# Patient Record
Sex: FEMALE | Race: BLACK OR AFRICAN AMERICAN | Employment: FULL TIME | URBAN - METROPOLITAN AREA
[De-identification: names, ages, dates, MRNs, and addresses within clinical notes are randomized per-mention and may not be internally consistent; named-entity substitution may affect disease eponyms.]

---

## 2017-12-11 ENCOUNTER — OP HISTORICAL/CONVERTED ENCOUNTER (OUTPATIENT)
Dept: OTHER | Age: 47
End: 2017-12-11

## 2018-02-07 ENCOUNTER — ED HISTORICAL/CONVERTED ENCOUNTER (OUTPATIENT)
Dept: OTHER | Age: 48
End: 2018-02-07

## 2019-01-31 ENCOUNTER — OP HISTORICAL/CONVERTED ENCOUNTER (OUTPATIENT)
Dept: OTHER | Age: 49
End: 2019-01-31

## 2019-02-08 LAB
CREATININE, EXTERNAL: 0.52
LDL-C, EXTERNAL: 136

## 2019-02-09 ENCOUNTER — OP HISTORICAL/CONVERTED ENCOUNTER (OUTPATIENT)
Dept: OTHER | Age: 49
End: 2019-02-09

## 2019-04-01 ENCOUNTER — OP HISTORICAL/CONVERTED ENCOUNTER (OUTPATIENT)
Dept: OTHER | Age: 49
End: 2019-04-01

## 2020-02-12 ENCOUNTER — OP HISTORICAL/CONVERTED ENCOUNTER (OUTPATIENT)
Dept: OTHER | Age: 50
End: 2020-02-12

## 2020-08-12 VITALS
TEMPERATURE: 98.9 F | RESPIRATION RATE: 18 BRPM | DIASTOLIC BLOOD PRESSURE: 85 MMHG | SYSTOLIC BLOOD PRESSURE: 131 MMHG | HEART RATE: 89 BPM | HEIGHT: 64 IN | WEIGHT: 237.6 LBS | BODY MASS INDEX: 40.56 KG/M2 | OXYGEN SATURATION: 98 %

## 2020-08-12 PROBLEM — M71.21 SYNOVIAL CYST OF RIGHT POPLITEAL SPACE: Status: ACTIVE | Noted: 2020-08-12

## 2020-08-12 PROBLEM — I10 ESSENTIAL HYPERTENSION: Status: ACTIVE | Noted: 2020-08-12

## 2020-08-12 PROBLEM — E66.01 MORBID OBESITY (HCC): Status: ACTIVE | Noted: 2020-08-12

## 2020-08-12 PROBLEM — M79.89 SWELLING OF LOWER LEG: Status: ACTIVE | Noted: 2020-08-12

## 2020-08-13 ENCOUNTER — OFFICE VISIT (OUTPATIENT)
Dept: INTERNAL MEDICINE CLINIC | Age: 50
End: 2020-08-13
Payer: COMMERCIAL

## 2020-08-13 VITALS
WEIGHT: 245.6 LBS | OXYGEN SATURATION: 97 % | HEART RATE: 97 BPM | DIASTOLIC BLOOD PRESSURE: 75 MMHG | HEIGHT: 62 IN | BODY MASS INDEX: 45.19 KG/M2 | TEMPERATURE: 99 F | SYSTOLIC BLOOD PRESSURE: 115 MMHG

## 2020-08-13 DIAGNOSIS — E66.01 MORBID OBESITY (HCC): Primary | ICD-10-CM

## 2020-08-13 DIAGNOSIS — I10 ESSENTIAL HYPERTENSION: ICD-10-CM

## 2020-08-13 PROCEDURE — 99213 OFFICE O/P EST LOW 20 MIN: CPT | Performed by: INTERNAL MEDICINE

## 2020-08-13 RX ORDER — NIFEDIPINE 60 MG/1
TABLET, EXTENDED RELEASE ORAL
COMMUNITY
Start: 2020-07-18 | End: 2020-12-21 | Stop reason: SDUPTHER

## 2020-08-13 RX ORDER — PHENTERMINE HYDROCHLORIDE 37.5 MG/1
37.5 TABLET ORAL
Qty: 30 TAB | Refills: 2 | Status: SHIPPED | OUTPATIENT
Start: 2020-08-13 | End: 2020-11-11

## 2020-08-13 RX ORDER — LORATADINE 10 MG/1
10 TABLET ORAL DAILY
COMMUNITY

## 2020-08-13 NOTE — PATIENT INSTRUCTIONS
Phentermine (By mouth) Phentermine (FEN-ter-meen) Helps you lose weight when used for a short time. Brand Name(s): Sandie Eng There may be other brand names for this medicine. When This Medicine Should Not Be Used: This medicine is not right for everyone. Do not use it if you had an allergic reaction to phentermine or similar medicines, or if you are pregnant. How to Use This Medicine:  
Dissolving Tablet, Capsule, Long Acting Capsule, Tablet, Dissolving Tablet · Your doctor will tell you how much medicine to use. Do not use more than directed. · This medicine is not for long-term use. · To avoid trouble sleeping, always take this medicine in the morning and never at bedtime or late in the evening. ¨ Take the capsule 2 hours after breakfast. 
¨ Take the extended-release capsule before breakfast. 
¨ Take the disintegrating tablet in the morning, with or without food. ¨ Take the phentermine tablet before breakfast or 1 to 2 hours after breakfast. 
¨ Take Lomaira tablet 30 minutes before meals. · Swallow the extended-release capsule whole. Do not crush, break, or chew it. · If you are using the disintegrating tablet, make sure your hands are dry before you handle the tablet. Place the tablet on your tongue. It should melt quickly. After the tablet has melted, swallow or take a drink of water. · Tablet: Swallow whole. Do not crush, break, or chew it. · Carefully follow your doctor's instructions about any special diet. · Missed dose: Take a dose as soon as you remember. If it is almost time for your next dose, wait until then and take a regular dose. Do not take extra medicine to make up for a missed dose. · Store the medicine in a closed container at room temperature, away from heat, moisture, and direct light. Drugs and Foods to Avoid: Ask your doctor or pharmacist before using any other medicine, including over-the-counter medicines, vitamins, and herbal products. · Do not use this medicine and an MAO inhibitor (MAOI) within 14 days of each other. · Some medicines can affect how phentermine works. Tell your doctor if you are using any of the following: ¨ Amphetamine medicine (including dextroamphetamine, methamphetamine) ¨ Diet pills ¨ Insulin or diabetes medicine ¨ Medicine to treat depression (including fluoxetine, fluvoxamine, paroxetine, sertraline) · Do not drink alcohol while you are using this medicine. Warnings While Using This Medicine: · It is not safe to take this medicine during pregnancy. It could harm an unborn baby. Tell your doctor right away if you become pregnant. · Tell your doctor if you are breastfeeding, or if you have kidney disease, diabetes, glaucoma, congestive heart failure, heart or blood vessel disease, high blood pressure, overactive thyroid, or a history of stroke or drug abuse. Tell your doctor if have allergies to aspirin or tartrazine. · This medicine may cause the following problems: 
¨ Primary pulmonary hypertension (a serious lung problem) ¨ Heart valve disease ¨ Changes in blood sugar levels · This medicine may make you dizzy or drowsy. Do not drive or do anything else that could be dangerous until you know how this medicine affects you. · This medicine can be habit-forming. Do not use more than your prescribed dose. Call your doctor if you think your medicine is not working. · Do not stop using this medicine suddenly. Your doctor will need to slowly decrease your dose before you stop it completely. · Your doctor will check your progress and the effects of this medicine at regular visits. Keep all appointments. · Keep all medicine out of the reach of children. Never share your medicine with anyone. Possible Side Effects While Using This Medicine:  
Call your doctor right away if you notice any of these side effects: · Allergic reaction: Itching or hives, swelling in your face or hands, swelling or tingling in your mouth or throat, chest tightness, trouble breathing · Chest pain, fainting, trouble breathing · Fast, slow, pounding, or uneven heartbeat · Seizures or tremors · Severe headache · Swelling of your feet or lower legs If you notice these less serious side effects, talk with your doctor: · Changes in sex drive · Dizziness, drowsiness, mild headache · Dry mouth or a bad taste in your mouth · Nausea, vomiting, diarrhea, constipation, stomach cramps · Restlessness or nervousness, trouble sleeping If you notice other side effects that you think are caused by this medicine, tell your doctor. Call your doctor for medical advice about side effects. You may report side effects to FDA at 1-165-FDA-9979 © 2017 [facility] [place] Information is for End User's use only and may not be sold, redistributed or otherwise used for commercial purposes. The above information is an  only. It is not intended as medical advice for individual conditions or treatments. Talk to your doctor, nurse or pharmacist before following any medical regimen to see if it is safe and effective for you.

## 2020-08-13 NOTE — PROGRESS NOTES
Verena Song is a 52 y.o. female and presents with Hypertension    Our last visit was virtual in May: \"essential hypertension  Not able to check it today, I encouraged her to get a bp cuff for self monitoring, revisited lifestyle modification. Renew NIFEdipine ER 60 mg tablet,extended release  insomnia  Discussed with her sleep hygiene measures. morbid obesity  Discussed importance of being constant with healthy habits. Goal to lose 15 lbs in the next 3 months, she has her diet at home, encouraged her to follow and encouraged her to chose healthy [place] carb snacks when doing grocery shopping, I gave her a list verbally\"    Today her blood pressure is at goal, well controlled, she has not lost any weight, the other hand she has gained 8 pounds since her last face-to-face visit in January. She says shee's snacking a lot in potato chips, ice cream, candy, she says because she's at home       Review of Systems  Review of Systems   Constitutional: Negative for chills, fatigue, fever and unexpected weight change. HENT: Negative for congestion, ear pain, sneezing and sore throat. Eyes: Negative for pain and discharge. Respiratory: Negative for cough, shortness of breath and wheezing. Cardiovascular: Negative for chest pain, palpitations and leg swelling. Gastrointestinal: Negative for abdominal pain, blood in stool, constipation and diarrhea. Endocrine: Negative for polydipsia and polyuria. Genitourinary: Negative for difficulty urinating, dysuria, frequency, hematuria and urgency. Musculoskeletal: Negative for arthralgias, back pain and joint swelling. Skin: Negative for rash. Allergic/Immunologic: Negative for environmental allergies and food allergies. Neurological: Negative for dizziness, speech difficulty, weakness, light-headedness, numbness and headaches. Hematological: Negative for adenopathy.    Psychiatric/Behavioral: Negative for behavioral problems (Depression), sleep disturbance and suicidal ideas. Past Medical History:   Diagnosis Date    Hypertension      Past Surgical History:   Procedure Laterality Date    HX HYSTERECTOMY       Social History     Socioeconomic History    Marital status: SINGLE     Spouse name: Not on file    Number of children: Not on file    Years of education: Not on file    Highest education level: Not on file   Tobacco Use    Smoking status: Never Smoker    Smokeless tobacco: Never Used   Substance and Sexual Activity    Alcohol use: Yes     Comment: occasional    Drug use: Never     Family History   Problem Relation Age of Onset    Hypertension Mother     Diabetes Father      Current Outpatient Medications   Medication Sig Dispense Refill    NIFEdipine ER (ADALAT CC) 60 mg ER tablet TAKE 1 TABLET BY MOUTH EVERY DAY      loratadine (Claritin) 10 mg tablet Take 10 mg by mouth daily.  phentermine (ADIPEX-P) 37.5 mg tablet Take 1 Tab by mouth every morning for 90 days. Max Daily Amount: 37.5 mg. Indications: weight loss management for an obese person 30 Tab 2     No Known Allergies    Objective:  Visit Vitals  /75 (BP 1 Location: Left arm, BP Patient Position: Sitting)   Pulse 97   Temp 99 °F (37.2 °C) (Oral)   Ht 5' 2\" (1.575 m)   Wt 245 lb 9.6 oz (111.4 kg)   SpO2 97% Comment: RA   BMI 44.92 kg/m²     Physical Exam:   Physical Exam  Constitutional:       General: She is not in acute distress. Appearance: Normal appearance. She is morbidly obese. HENT:      Head: Normocephalic and atraumatic. Mouth/Throat:      Mouth: Mucous membranes are moist.   Eyes:      Extraocular Movements: Extraocular movements intact. Conjunctiva/sclera: Conjunctivae normal.      Pupils: Pupils are equal, round, and reactive to light. Neck:      Musculoskeletal: Normal range of motion and neck supple. Cardiovascular:      Rate and Rhythm: Normal rate and regular rhythm. Pulses: Normal pulses. Heart sounds: Normal heart sounds. Pulmonary:      Effort: Pulmonary effort is normal.      Breath sounds: Normal breath sounds. Abdominal:      General: Abdomen is flat. Bowel sounds are normal. There is no distension. Palpations: Abdomen is soft. There is no mass. Tenderness: There is no abdominal tenderness. Musculoskeletal:         General: No swelling or deformity. Right lower leg: No edema. Left lower leg: No edema. Lymphadenopathy:      Cervical: No cervical adenopathy. Skin:     General: Skin is warm and dry. Capillary Refill: Capillary refill takes less than 2 seconds. Coloration: Skin is not jaundiced or pale. Findings: No erythema or rash. Neurological:      General: No focal deficit present. Mental Status: She is alert and oriented to person, place, and time. Psychiatric:         Mood and Affect: Mood normal.         Behavior: Behavior normal.         Thought Content: Thought content normal.         Judgment: Judgment normal.          Results for orders placed or performed in visit on 08/12/20   AMB EXT LDL-C   Result Value Ref Range    LDL-C, External 136    AMB EXT CREATININE   Result Value Ref Range    Creatinine, External 0.52        Assessment/Plan:      Pressures well controlled continue nifedipine 60 mg, she does not need refills today. Gaining weight, BMI 44, not following diet recommendations, she says she feels hungry all the time, now that her blood pressure is well controlled we can safely do a trial of phentermine for 3 months, I educated her about the purpose of this medication, potential side effects, goal to lose 5% of her weight within the next 3 months. ICD-10-CM ICD-9-CM    1. Morbid obesity (HCC)  E66.01 278.01 phentermine (ADIPEX-P) 37.5 mg tablet   2.  Essential hypertension  I10 401.9      Orders Placed This Encounter    NIFEdipine ER (ADALAT CC) 60 mg ER tablet     Sig: TAKE 1 TABLET BY MOUTH EVERY DAY    loratadine (Claritin) 10 mg tablet     Sig: Take 10 mg by mouth daily.  phentermine (ADIPEX-P) 37.5 mg tablet     Sig: Take 1 Tab by mouth every morning for 90 days. Max Daily Amount: 37.5 mg. Indications: weight loss management for an obese person     Dispense:  30 Tab     Refill:  2     lose weight, increase physical activity, follow low fat diet, call if any problems  Patient Instructions   Phentermine (By mouth)   Phentermine (FEN-ter-meen)  Helps you lose weight when used for a short time. Brand Name(s): Adipex-P, Lomaira   There may be other brand names for this medicine. When This Medicine Should Not Be Used: This medicine is not right for everyone. Do not use it if you had an allergic reaction to phentermine or similar medicines, or if you are pregnant. How to Use This Medicine:   Dissolving Tablet, Capsule, Long Acting Capsule, Tablet, Dissolving Tablet  · Your doctor will tell you how much medicine to use. Do not use more than directed. · This medicine is not for long-term use. · To avoid trouble sleeping, always take this medicine in the morning and never at bedtime or late in the evening. ¨ Take the capsule 2 hours after breakfast.  ¨ Take the extended-release capsule before breakfast.  ¨ Take the disintegrating tablet in the morning, with or without food. ¨ Take the phentermine tablet before breakfast or 1 to 2 hours after breakfast.  ¨ Take Lomaira tablet 30 minutes before meals. · Swallow the extended-release capsule whole. Do not crush, break, or chew it. · If you are using the disintegrating tablet, make sure your hands are dry before you handle the tablet. Place the tablet on your tongue. It should melt quickly. After the tablet has melted, swallow or take a drink of water. · Tablet: Swallow whole. Do not crush, break, or chew it. · Carefully follow your doctor's instructions about any special diet. · Missed dose: Take a dose as soon as you remember.  If it is almost time for your next dose, wait until then and take a regular dose. Do not take extra medicine to make up for a missed dose. · Store the medicine in a closed container at room temperature, away from heat, moisture, and direct light. Drugs and Foods to Avoid:   Ask your doctor or pharmacist before using any other medicine, including over-the-counter medicines, vitamins, and herbal products. · Do not use this medicine and an MAO inhibitor (MAOI) within 14 days of each other. · Some medicines can affect how phentermine works. Tell your doctor if you are using any of the following:  ¨ Amphetamine medicine (including dextroamphetamine, methamphetamine)  ¨ Diet pills  ¨ Insulin or diabetes medicine  ¨ Medicine to treat depression (including fluoxetine, fluvoxamine, paroxetine, sertraline)  · Do not drink alcohol while you are using this medicine. Warnings While Using This Medicine:   · It is not safe to take this medicine during pregnancy. It could harm an unborn baby. Tell your doctor right away if you become pregnant. · Tell your doctor if you are breastfeeding, or if you have kidney disease, diabetes, glaucoma, congestive heart failure, heart or blood vessel disease, high blood pressure, overactive thyroid, or a history of stroke or drug abuse. Tell your doctor if have allergies to aspirin or tartrazine. · This medicine may cause the following problems:  ¨ Primary pulmonary hypertension (a serious lung problem)  ¨ Heart valve disease  ¨ Changes in blood sugar levels  · This medicine may make you dizzy or drowsy. Do not drive or do anything else that could be dangerous until you know how this medicine affects you. · This medicine can be habit-forming. Do not use more than your prescribed dose. Call your doctor if you think your medicine is not working. · Do not stop using this medicine suddenly. Your doctor will need to slowly decrease your dose before you stop it completely. · Your doctor will check your progress and the effects of this medicine at regular visits.  Keep all appointments. · Keep all medicine out of the reach of children. Never share your medicine with anyone. Possible Side Effects While Using This Medicine:   Call your doctor right away if you notice any of these side effects:  · Allergic reaction: Itching or hives, swelling in your face or hands, swelling or tingling in your mouth or throat, chest tightness, trouble breathing  · Chest pain, fainting, trouble breathing  · Fast, slow, pounding, or uneven heartbeat  · Seizures or tremors  · Severe headache  · Swelling of your feet or lower legs  If you notice these less serious side effects, talk with your doctor:   · Changes in sex drive  · Dizziness, drowsiness, mild headache  · Dry mouth or a bad taste in your mouth  · Nausea, vomiting, diarrhea, constipation, stomach cramps  · Restlessness or nervousness, trouble sleeping  If you notice other side effects that you think are caused by this medicine, tell your doctor. Call your doctor for medical advice about side effects. You may report side effects to FDA at 1-820-JQZ-0805  © 2017 [facility] Information is for End User's use only and may not be sold, redistributed or otherwise used for commercial purposes. The above information is an  only. It is not intended as medical advice for individual conditions or treatments. Talk to your doctor, nurse or pharmacist before following any medical regimen to see if it is safe and effective for you. Follow-up and Dispositions    · Return in about 3 months (around 11/13/2020).

## 2020-12-21 ENCOUNTER — OFFICE VISIT (OUTPATIENT)
Dept: INTERNAL MEDICINE CLINIC | Age: 50
End: 2020-12-21
Payer: COMMERCIAL

## 2020-12-21 VITALS
OXYGEN SATURATION: 100 % | HEIGHT: 61 IN | TEMPERATURE: 97.8 F | WEIGHT: 230.2 LBS | DIASTOLIC BLOOD PRESSURE: 86 MMHG | BODY MASS INDEX: 43.46 KG/M2 | SYSTOLIC BLOOD PRESSURE: 136 MMHG | HEART RATE: 82 BPM

## 2020-12-21 DIAGNOSIS — I10 ESSENTIAL HYPERTENSION: Primary | ICD-10-CM

## 2020-12-21 DIAGNOSIS — Z12.11 COLON CANCER SCREENING: ICD-10-CM

## 2020-12-21 PROCEDURE — 99213 OFFICE O/P EST LOW 20 MIN: CPT | Performed by: INTERNAL MEDICINE

## 2020-12-21 RX ORDER — NIFEDIPINE 60 MG/1
60 TABLET, EXTENDED RELEASE ORAL DAILY
Qty: 90 TAB | Refills: 1 | Status: SHIPPED | OUTPATIENT
Start: 2020-12-21 | End: 2021-06-15 | Stop reason: SDUPTHER

## 2020-12-21 NOTE — PROGRESS NOTES
Mikie Thrasher is a 52 y.o. female and presents with Follow-up, Hypertension, Morbid Obesity, and Leg Swelling    Nisa Case is doing well, she still has episodes of ankle swelling, More swelinng towardsthe ennd of the day  No visit to the ER   Doesn't want flu shot  Pap Jan 2020 normal.         Review of Systems  Review of Systems   Constitutional: Negative for chills, fatigue, fever and unexpected weight change. HENT: Negative for congestion, ear pain, sneezing and sore throat. Eyes: Negative for pain and discharge. Respiratory: Negative for cough, shortness of breath and wheezing. Cardiovascular: Positive for leg swelling. Negative for chest pain and palpitations. Gastrointestinal: Negative for abdominal pain, blood in stool, constipation and diarrhea. Endocrine: Negative for polydipsia and polyuria. Genitourinary: Negative for difficulty urinating, dysuria, frequency, hematuria and urgency. Musculoskeletal: Negative for arthralgias, back pain and joint swelling. Skin: Negative for rash. Allergic/Immunologic: Negative for environmental allergies and food allergies. Neurological: Negative for dizziness, speech difficulty, weakness, light-headedness, numbness and headaches. Hematological: Negative for adenopathy. Psychiatric/Behavioral: Negative for behavioral problems (Depression), sleep disturbance and suicidal ideas.           Past Medical History:   Diagnosis Date    Hypertension      Past Surgical History:   Procedure Laterality Date    HX HYSTERECTOMY       Social History     Socioeconomic History    Marital status: SINGLE     Spouse name: Not on file    Number of children: Not on file    Years of education: Not on file    Highest education level: Not on file   Tobacco Use    Smoking status: Never Smoker    Smokeless tobacco: Never Used   Substance and Sexual Activity    Alcohol use: Yes     Frequency: 2-4 times a month     Drinks per session: 1 or 2     Binge frequency: Never     Comment: occasional    Drug use: Never     Family History   Problem Relation Age of Onset    Hypertension Mother     Diabetes Father      Current Outpatient Medications   Medication Sig Dispense Refill    NIFEdipine ER (ADALAT CC) 60 mg ER tablet Take 1 Tab by mouth daily for 180 days. 90 Tab 1    loratadine (Claritin) 10 mg tablet Take 10 mg by mouth daily. No Known Allergies    Objective:  Visit Vitals  /86 (BP 1 Location: Right arm, BP Patient Position: Sitting)   Pulse 82   Temp 97.8 °F (36.6 °C) (Oral)   Ht 5' 1\" (1.549 m)   Wt 230 lb 3.2 oz (104.4 kg)   SpO2 100% Comment: RA   BMI 43.50 kg/m²     Physical Exam:   Physical Exam  Constitutional:       General: She is not in acute distress. Appearance: Normal appearance. She is morbidly obese. HENT:      Head: Normocephalic and atraumatic. Mouth/Throat:      Mouth: Mucous membranes are moist.   Eyes:      Extraocular Movements: Extraocular movements intact. Conjunctiva/sclera: Conjunctivae normal.      Pupils: Pupils are equal, round, and reactive to light. Neck:      Musculoskeletal: Normal range of motion and neck supple. Cardiovascular:      Rate and Rhythm: Normal rate and regular rhythm. Pulses: Normal pulses. Heart sounds: Normal heart sounds. Pulmonary:      Effort: Pulmonary effort is normal.      Breath sounds: Normal breath sounds. Abdominal:      General: Abdomen is flat. Bowel sounds are normal. There is no distension. Palpations: Abdomen is soft. There is no mass. Tenderness: There is no abdominal tenderness. Musculoskeletal:         General: No swelling or deformity. Right lower leg: No edema. Left lower leg: No edema. Lymphadenopathy:      Cervical: No cervical adenopathy. Skin:     General: Skin is warm and dry. Capillary Refill: Capillary refill takes less than 2 seconds. Coloration: Skin is not jaundiced or pale. Findings: No erythema or rash. Neurological:      General: No focal deficit present. Mental Status: She is alert and oriented to person, place, and time. Psychiatric:         Mood and Affect: Mood normal.         Behavior: Behavior normal.         Thought Content: Thought content normal.         Judgment: Judgment normal.          Results for orders placed or performed in visit on 08/12/20   AMB EXT LDL-C   Result Value Ref Range    LDL-C, External 136    AMB EXT CREATININE   Result Value Ref Range    Creatinine, External 0.52        Assessment/Plan:    Hypertension is well controlled continue treatment without changes. ICD-10-CM ICD-9-CM    1. Essential hypertension  I10 401.9 CBC WITH AUTOMATED DIFF      METABOLIC PANEL, COMPREHENSIVE      TSH 3RD GENERATION      T4, FREE      LIPID PANEL      NIFEdipine ER (ADALAT CC) 60 mg ER tablet   2. Colon cancer screening  Z12.11 V76.51 REFERRAL TO GASTROENTEROLOGY     Orders Placed This Encounter    CBC WITH AUTOMATED DIFF    METABOLIC PANEL, COMPREHENSIVE    TSH 3RD GENERATION    T4, FREE    LIPID PANEL    REFERRAL TO GASTROENTEROLOGY     Referral Priority:   Routine     Referral Type:   Consultation     Referral Reason:   Specialty Services Required     Referred to Provider:   Evelin Kohler MD     Number of Visits Requested:   1    NIFEdipine ER (ADALAT CC) 60 mg ER tablet     Sig: Take 1 Tab by mouth daily for 180 days. Dispense:  90 Tab     Refill:  1     lose weight, increase physical activity, follow low fat diet, follow low salt diet, continue present plan, routine labs ordered, call if any problems  There are no Patient Instructions on file for this visit. Follow-up and Dispositions    · Return in about 3 months (around 3/21/2021).

## 2021-02-23 ENCOUNTER — OFFICE VISIT (OUTPATIENT)
Dept: OBGYN CLINIC | Age: 51
End: 2021-02-23
Payer: COMMERCIAL

## 2021-02-23 VITALS
BODY MASS INDEX: 41.86 KG/M2 | SYSTOLIC BLOOD PRESSURE: 130 MMHG | HEIGHT: 62 IN | DIASTOLIC BLOOD PRESSURE: 86 MMHG | WEIGHT: 227.5 LBS

## 2021-02-23 DIAGNOSIS — Z01.419 ROUTINE GYNECOLOGICAL EXAMINATION: Primary | ICD-10-CM

## 2021-02-23 DIAGNOSIS — Z12.31 ENCOUNTER FOR SCREENING MAMMOGRAM FOR MALIGNANT NEOPLASM OF BREAST: ICD-10-CM

## 2021-02-23 PROCEDURE — 99396 PREV VISIT EST AGE 40-64: CPT | Performed by: OBSTETRICS & GYNECOLOGY

## 2021-02-23 NOTE — PROGRESS NOTES
Natty Pratt is a 52 y.o. female who presents today for the following:  Chief Complaint   Patient presents with    Annual Exam        No Known Allergies    Current Outpatient Medications   Medication Sig    NIFEdipine ER (ADALAT CC) 60 mg ER tablet Take 1 Tab by mouth daily for 180 days.  loratadine (Claritin) 10 mg tablet Take 10 mg by mouth daily. No current facility-administered medications for this visit.         Past Medical History:   Diagnosis Date    Hypertension        Past Surgical History:   Procedure Laterality Date    HX CHOLECYSTECTOMY      HX HYSTERECTOMY         Family History   Problem Relation Age of Onset    Hypertension Mother     Diabetes Father        Social History     Socioeconomic History    Marital status: SINGLE     Spouse name: Not on file    Number of children: Not on file    Years of education: Not on file    Highest education level: Not on file   Occupational History    Not on file   Social Needs    Financial resource strain: Not on file    Food insecurity     Worry: Not on file     Inability: Not on file    Transportation needs     Medical: Not on file     Non-medical: Not on file   Tobacco Use    Smoking status: Never Smoker    Smokeless tobacco: Never Used   Substance and Sexual Activity    Alcohol use: Yes     Frequency: 2-4 times a month     Drinks per session: 1 or 2     Binge frequency: Never     Comment: occasional    Drug use: Never    Sexual activity: Not on file   Lifestyle    Physical activity     Days per week: Not on file     Minutes per session: Not on file    Stress: Not on file   Relationships    Social connections     Talks on phone: Not on file     Gets together: Not on file     Attends Christianity service: Not on file     Active member of club or organization: Not on file     Attends meetings of clubs or organizations: Not on file     Relationship status: Not on file    Intimate partner violence     Fear of current or ex partner: Not on file     Emotionally abused: Not on file     Physically abused: Not on file     Forced sexual activity: Not on file   Other Topics Concern    Not on file   Social History Narrative    Not on file         HPI  Here for annual exam.  Patient has no complaints. ROS   Review of Systems   Constitutional: Negative. HENT: Negative. Eyes: Negative. Respiratory: Negative. Cardiovascular: Negative. Gastrointestinal: Negative. Genitourinary: Negative. Musculoskeletal: Negative. Skin: Negative. Neurological: Negative. Endo/Heme/Allergies: Negative. Psychiatric/Behavioral: Negative. /86 (BP 1 Location: Left upper arm, BP Patient Position: Sitting)   Ht 5' 2\" (1.575 m)   Wt 227 lb 8 oz (103.2 kg)   BMI 41.61 kg/m²    OBGyn Exam   Constitutional  · Appearance: well-nourished, well developed, alert, in no acute distress    HENT  · Head and Face: appears normal    Neck  · Inspection/Palpation: normal appearance, no masses or tenderness  · Lymph Nodes: no lymphadenopathy present  · Thyroid: gland size normal, nontender, no nodules or masses present on palpation    Breasts   Symmetric, no palpable masses, no tenderness, no skin changes, no nipple abnormality, no nipple discharge, no lymphadenopathy.     Chest  · Respiratory Effort: breathing labored  · Auscultation: normal breath sounds    Cardiovascular  · Heart:  · Auscultation: regular rate and rhythm without murmur    Gastrointestinal  · Abdominal Examination: abdomen non-tender to palpation, normal bowel sounds, no masses present  · Liver and spleen: no hepatomegaly present, spleen not palpable  · Hernias: no hernias identified    Genitourinary  · External Genitalia: normal appearance for age, no discharge present, no tenderness present, no inflammatory lesions present, no masses present, no atrophy present  · Vagina: normal vaginal vault without central or paravaginal defects, no discharge present, no inflammatory lesions present, no masses present  · Bladder: non-tender to palpation  · Urethra: appears normal  · Cervix: Absent  · Uterus: Absent  · Adnexa: no adnexal tenderness present, no adnexal masses present  · Perineum: perineum within normal limits, no evidence of trauma, no rashes or skin lesions present  · Anus: anus within normal limits, no hemorrhoids present  · Inguinal Lymph Nodes: no lymphadenopathy present    Skin  · General Inspection: no rash, no lesions identified    Neurologic/Psychiatric  · Mental Status:  · Orientation: grossly oriented to person, place and time  · Mood and Affect: mood normal, affect appropriate    No results found for this visit on 02/23/21. Orders Placed This Encounter    [facility] 3D AMIRA W MAMMO BI SCREENING INCL CAD     Standing Status:   Future     Standing Expiration Date:   2/23/2022     Order Specific Question:   Is Patient Pregnant? Answer:   No     Order Specific Question:   Reason for Exam     Answer:   Screening         1. Routine gynecological examination  Reviewed Pap smear/HPV, mammogram, bone density colonoscopy testing guidelines. Encouraged healthy lifestyle. Discussed importanceof getting annual exams. Discussed the risk of osteoporosis and recommended 1200 to 1500 mg of calcium as well as vitamin D supplementation daily. Recommended monthly self breast exams and instructed and demonstrated to the patient on the proper technique educated on the importance of healthy weight management and the significance of not smoking. 2. Encounter for screening mammogram for malignant neoplasm of breast    - [facility] 3D AMIRA W MAMMO BI SCREENING INCL CAD;  Future        Follow-up and Dispositions    · Return in about 1 year (around 2/23/2022) for Annual Exam.

## 2021-03-12 ENCOUNTER — HOSPITAL ENCOUNTER (OUTPATIENT)
Dept: MAMMOGRAPHY | Age: 51
Discharge: HOME | End: 2021-03-12
Attending: OBSTETRICS & GYNECOLOGY
Payer: COMMERCIAL

## 2021-03-12 DIAGNOSIS — Z12.31 ENCOUNTER FOR SCREENING MAMMOGRAM FOR MALIGNANT NEOPLASM OF BREAST: ICD-10-CM

## 2021-03-12 PROCEDURE — 77063 BREAST TOMOSYNTHESIS BI: CPT

## 2021-03-23 ENCOUNTER — OFFICE VISIT (OUTPATIENT)
Dept: INTERNAL MEDICINE CLINIC | Age: 51
End: 2021-03-23
Payer: COMMERCIAL

## 2021-03-23 VITALS
HEIGHT: 62 IN | RESPIRATION RATE: 20 BRPM | TEMPERATURE: 97.7 F | BODY MASS INDEX: 41.85 KG/M2 | HEART RATE: 68 BPM | OXYGEN SATURATION: 99 % | WEIGHT: 227.4 LBS | SYSTOLIC BLOOD PRESSURE: 121 MMHG | DIASTOLIC BLOOD PRESSURE: 78 MMHG

## 2021-03-23 DIAGNOSIS — E66.01 MORBID OBESITY (HCC): ICD-10-CM

## 2021-03-23 DIAGNOSIS — I10 ESSENTIAL HYPERTENSION: Primary | ICD-10-CM

## 2021-03-23 DIAGNOSIS — Z11.59 NEED FOR HEPATITIS C SCREENING TEST: ICD-10-CM

## 2021-03-23 DIAGNOSIS — R73.9 HYPERGLYCEMIA: ICD-10-CM

## 2021-03-23 PROCEDURE — 99214 OFFICE O/P EST MOD 30 MIN: CPT | Performed by: INTERNAL MEDICINE

## 2021-03-23 NOTE — PROGRESS NOTES
Sarah Quiroz is a 52 y.o. female and presents with Follow-up, Hypertension, and Morbid Obesity    Elizabeth Hebert has had no illness since last visit, but overall has lost 13 pounds since August last year. She has not been exercising and is trying to follow diet. Hypertension is well controlled. She is feeling well has no concerns. Review of Systems  Review of Systems   Constitutional: Negative for chills, fatigue, fever and unexpected weight change. HENT: Negative for congestion, ear pain, sneezing and sore throat. Eyes: Negative for pain and discharge. Respiratory: Negative for cough, shortness of breath and wheezing. Cardiovascular: Negative for chest pain, palpitations and leg swelling. Gastrointestinal: Negative for abdominal pain, blood in stool, constipation and diarrhea. Endocrine: Negative for polydipsia and polyuria. Genitourinary: Negative for difficulty urinating, dysuria, frequency, hematuria and urgency. Musculoskeletal: Negative for arthralgias, back pain and joint swelling. Skin: Negative for rash. Allergic/Immunologic: Negative for environmental allergies and food allergies. Neurological: Negative for dizziness, speech difficulty, weakness, light-headedness, numbness and headaches. Hematological: Negative for adenopathy. Psychiatric/Behavioral: Negative for behavioral problems (Depression), sleep disturbance and suicidal ideas.           Past Medical History:   Diagnosis Date    Hypertension      Past Surgical History:   Procedure Laterality Date    HX CHOLECYSTECTOMY      HX HYSTERECTOMY       Social History     Socioeconomic History    Marital status: SINGLE     Spouse name: Not on file    Number of children: Not on file    Years of education: Not on file    Highest education level: Not on file   Tobacco Use    Smoking status: Never Smoker    Smokeless tobacco: Never Used   Substance and Sexual Activity    Alcohol use: Yes     Frequency: 2-4 times a month Drinks per session: 1 or 2     Binge frequency: Never     Comment: occasional    Drug use: Never     Family History   Problem Relation Age of Onset    Hypertension Mother     Diabetes Father     Breast Cancer Paternal Aunt      Current Outpatient Medications   Medication Sig Dispense Refill    NIFEdipine ER (ADALAT CC) 60 mg ER tablet Take 1 Tab by mouth daily for 180 days. 90 Tab 1    loratadine (Claritin) 10 mg tablet Take 10 mg by mouth daily. No Known Allergies    Objective:  Visit Vitals  /78 (BP 1 Location: Right upper arm, BP Patient Position: Sitting, BP Cuff Size: Adult)   Pulse 68   Temp 97.7 °F (36.5 °C) (Oral)   Resp 20   Ht 5' 2\" (1.575 m)   Wt 227 lb 6.4 oz (103.1 kg)   SpO2 99% Comment: RA   BMI 41.59 kg/m²     Physical Exam:   Physical Exam  Constitutional:       General: She is not in acute distress. Appearance: Normal appearance. She is morbidly obese. HENT:      Head: Normocephalic and atraumatic. Mouth/Throat:      Mouth: Mucous membranes are moist.   Eyes:      Extraocular Movements: Extraocular movements intact. Conjunctiva/sclera: Conjunctivae normal.      Pupils: Pupils are equal, round, and reactive to light. Neck:      Musculoskeletal: Normal range of motion and neck supple. Cardiovascular:      Rate and Rhythm: Normal rate and regular rhythm. Pulses: Normal pulses. Heart sounds: Normal heart sounds. Pulmonary:      Effort: Pulmonary effort is normal.      Breath sounds: Normal breath sounds. Abdominal:      General: Abdomen is flat. Bowel sounds are normal. There is no distension. Palpations: Abdomen is soft. There is no mass. Tenderness: There is no abdominal tenderness. Musculoskeletal:         General: No swelling or deformity. Right lower leg: No edema. Left lower leg: No edema. Lymphadenopathy:      Cervical: No cervical adenopathy. Skin:     General: Skin is warm and dry.       Capillary Refill: Capillary refill takes less than 2 seconds. Coloration: Skin is not jaundiced or pale. Findings: No erythema or rash. Neurological:      General: No focal deficit present. Mental Status: She is alert and oriented to person, place, and time. Psychiatric:         Mood and Affect: Mood normal.         Behavior: Behavior normal.         Thought Content: Thought content normal.         Judgment: Judgment normal.          Results for orders placed or performed in visit on 08/12/20   AMB EXT LDL-C   Result Value Ref Range    LDL-C, External 136    AMB EXT CREATININE   Result Value Ref Range    Creatinine, External 0.52        Assessment/Plan:    Hypertension is well controlled check annual labs, continue nifedipine 60 mg without changes. Counseled about weight loss and exercise. Goal to lose 5% of her weight in the next 3 months. ICD-10-CM ICD-9-CM    1. Essential hypertension  I10 401.9 LIPID PANEL      METABOLIC PANEL, COMPREHENSIVE      T4, FREE      TSH 3RD GENERATION      CBC WITH AUTOMATED DIFF   2. Need for hepatitis C screening test  Z11.59 V73.89 HEPATITIS C AB   3. Morbid obesity ([facility]  E66.01 278.01      Orders Placed This Encounter    LIPID PANEL    METABOLIC PANEL, COMPREHENSIVE    T4, FREE    TSH 3RD GENERATION    CBC WITH AUTOMATED DIFF    HEPATITIS C AB     lose weight, increase physical activity, follow low fat diet, follow low salt diet, continue present plan, routine labs ordered, call if any problems  There are no Patient Instructions on file for this visit. Follow-up and Dispositions    · Return in about 6 months (around 9/23/2021).

## 2021-03-23 NOTE — PROGRESS NOTES
1. Have you been to the ER, urgent care clinic since your last visit? Hospitalized since your last visit? No    2. Have you seen or consulted any other health care providers outside of the [address] [place] [address] since your last visit? Include any pap smears or colon screening. No     Chief Complaint   Patient presents with    Follow-up    Hypertension    Morbid Obesity     Pt states that she is here for a f/u visit.

## 2021-04-03 LAB
ALBUMIN SERPL-MCNC: 4.5 G/DL (ref 3.8–4.8)
ALBUMIN/GLOB SERPL: 1.7 {RATIO} (ref 1.2–2.2)
ALP SERPL-CCNC: 90 IU/L (ref 39–117)
ALT SERPL-CCNC: 17 IU/L (ref 0–32)
AST SERPL-CCNC: 17 IU/L (ref 0–40)
BASOPHILS # BLD AUTO: 0 X10E3/UL (ref 0–0.2)
BASOPHILS NFR BLD AUTO: 0 %
BILIRUB SERPL-MCNC: 0.3 MG/DL (ref 0–1.2)
BUN SERPL-MCNC: 9 MG/DL (ref 6–24)
BUN/CREAT SERPL: 16 (ref 9–23)
CALCIUM SERPL-MCNC: 9.4 MG/DL (ref 8.7–10.2)
CHLORIDE SERPL-SCNC: 105 MMOL/L (ref 96–106)
CHOLEST SERPL-MCNC: 227 MG/DL (ref 100–199)
CO2 SERPL-SCNC: 22 MMOL/L (ref 20–29)
CREAT SERPL-MCNC: 0.55 MG/DL (ref 0.57–1)
EOSINOPHIL # BLD AUTO: 0 X10E3/UL (ref 0–0.4)
EOSINOPHIL NFR BLD AUTO: 1 %
ERYTHROCYTE [DISTWIDTH] IN BLOOD BY AUTOMATED COUNT: 11.2 % (ref 11.7–15.4)
GLOBULIN SER CALC-MCNC: 2.6 G/DL (ref 1.5–4.5)
GLUCOSE SERPL-MCNC: 144 MG/DL (ref 65–99)
HCT VFR BLD AUTO: 41.9 % (ref 34–46.6)
HCV AB S/CO SERPL IA: <0.1 S/CO RATIO (ref 0–0.9)
HDLC SERPL-MCNC: 70 MG/DL
HGB BLD-MCNC: 13.9 G/DL (ref 11.1–15.9)
IMM GRANULOCYTES # BLD AUTO: 0 X10E3/UL (ref 0–0.1)
IMM GRANULOCYTES NFR BLD AUTO: 0 %
LDLC SERPL CALC-MCNC: 149 MG/DL (ref 0–99)
LYMPHOCYTES # BLD AUTO: 1.7 X10E3/UL (ref 0.7–3.1)
LYMPHOCYTES NFR BLD AUTO: 42 %
MCH RBC QN AUTO: 31.2 PG (ref 26.6–33)
MCHC RBC AUTO-ENTMCNC: 33.2 G/DL (ref 31.5–35.7)
MCV RBC AUTO: 94 FL (ref 79–97)
MONOCYTES # BLD AUTO: 0.3 X10E3/UL (ref 0.1–0.9)
MONOCYTES NFR BLD AUTO: 6 %
NEUTROPHILS # BLD AUTO: 2 X10E3/UL (ref 1.4–7)
NEUTROPHILS NFR BLD AUTO: 51 %
PLATELET # BLD AUTO: 305 X10E3/UL (ref 150–450)
POTASSIUM SERPL-SCNC: 4 MMOL/L (ref 3.5–5.2)
PROT SERPL-MCNC: 7.1 G/DL (ref 6–8.5)
RBC # BLD AUTO: 4.45 X10E6/UL (ref 3.77–5.28)
SODIUM SERPL-SCNC: 140 MMOL/L (ref 134–144)
T4 FREE SERPL-MCNC: 1.14 NG/DL (ref 0.82–1.77)
TRIGL SERPL-MCNC: 45 MG/DL (ref 0–149)
TSH SERPL DL<=0.005 MIU/L-ACNC: 0.99 UIU/ML (ref 0.45–4.5)
VLDLC SERPL CALC-MCNC: 8 MG/DL (ref 5–40)
WBC # BLD AUTO: 4.1 X10E3/UL (ref 3.4–10.8)

## 2021-04-09 NOTE — PROGRESS NOTES
Ronal, can you please call her and schedule her for a follow up in June, I already sent her the following message to her portal, her it goes just in case she has not read it by the time you call her. Thanks     Vikram Porter, your results show your cholesterol is higher than before and your glucose is elevated, which is new. This elevation of glucose is in Diabetes range. We need to do an additional test called Hemoglobin A1C. If it's more tan 6.5% it would be confirmatory of Diabetes. Please when you have a chance go to the office to this this blood work. In the mean time follow diet and be very strict with it as well as with exercise. When I see the new result we will discuss about either trying to manage this with diet and exercise only or if we need to start medication. I also need to see you earlier than September definitely. I will plan to see you again in 2 months from now, you will receive a call from the office to schedule you. Please let me know if you have any questions.

## 2021-04-28 LAB
EST. AVERAGE GLUCOSE BLD GHB EST-MCNC: 94 MG/DL
HBA1C MFR BLD: 4.9 % (ref 4.8–5.6)

## 2021-06-15 ENCOUNTER — OFFICE VISIT (OUTPATIENT)
Dept: INTERNAL MEDICINE CLINIC | Age: 51
End: 2021-06-15
Payer: COMMERCIAL

## 2021-06-15 VITALS
HEIGHT: 61 IN | SYSTOLIC BLOOD PRESSURE: 121 MMHG | RESPIRATION RATE: 16 BRPM | WEIGHT: 231 LBS | BODY MASS INDEX: 43.61 KG/M2 | DIASTOLIC BLOOD PRESSURE: 81 MMHG | HEART RATE: 81 BPM | OXYGEN SATURATION: 99 %

## 2021-06-15 DIAGNOSIS — I10 ESSENTIAL HYPERTENSION: ICD-10-CM

## 2021-06-15 DIAGNOSIS — E66.01 MORBID OBESITY (HCC): Primary | ICD-10-CM

## 2021-06-15 PROCEDURE — 99214 OFFICE O/P EST MOD 30 MIN: CPT | Performed by: INTERNAL MEDICINE

## 2021-06-15 RX ORDER — NIFEDIPINE 60 MG/1
60 TABLET, EXTENDED RELEASE ORAL DAILY
Qty: 90 TABLET | Refills: 2 | Status: SHIPPED | OUTPATIENT
Start: 2021-06-15 | End: 2022-03-14

## 2021-06-15 NOTE — PROGRESS NOTES
Esau Menon is a 54 y.o. female and presents with Follow Up Chronic Condition and Hypertension    Her HTN is well controlled, has been taking Nifedipine, no issues, no leg sweelling, no CP, no sob. She gained 4 lbs after vacation, she says she ate a lot. No ER visits  She got first dose of covid vaccine yesterday. Review of Systems  Review of Systems   Constitutional: Negative for chills, fatigue, fever and unexpected weight change. HENT: Negative for congestion, ear pain, sneezing and sore throat. Eyes: Negative for pain and discharge. Respiratory: Negative for cough, shortness of breath and wheezing. Cardiovascular: Negative for chest pain, palpitations and leg swelling. Gastrointestinal: Negative for abdominal pain, blood in stool, constipation and diarrhea. Endocrine: Negative for polydipsia and polyuria. Genitourinary: Negative for difficulty urinating, dysuria, frequency, hematuria and urgency. Musculoskeletal: Negative for arthralgias, back pain and joint swelling. Skin: Negative for rash. Allergic/Immunologic: Negative for environmental allergies and food allergies. Neurological: Negative for dizziness, speech difficulty, weakness, light-headedness, numbness and headaches. Hematological: Negative for adenopathy. Psychiatric/Behavioral: Negative for behavioral problems (Depression), sleep disturbance and suicidal ideas. Past Medical History:   Diagnosis Date    Hypertension      Past Surgical History:   Procedure Laterality Date    HX CHOLECYSTECTOMY      HX HYSTERECTOMY       Social History     Socioeconomic History    Marital status: SINGLE     Spouse name: Not on file    Number of children: Not on file    Years of education: Not on file    Highest education level: Not on file   Tobacco Use    Smoking status: Never Smoker    Smokeless tobacco: Never Used   Vaping Use    Vaping Use: Never used   Substance and Sexual Activity    Alcohol use:  Yes Comment: occasional    Drug use: Never     Social Determinants of Health     Financial Resource Strain:     Difficulty of Paying Living Expenses:    Food Insecurity:     Worried About Running Out of Food in the Last Year:     [address] in the Last Year:    Transportation Needs:     Lack of Transportation (Medical):  Lack of Transportation (Non-Medical):    Physical Activity:     Days of Exercise per Week:     Minutes of Exercise per Session:    Stress:     Feeling of Stress :    Social Connections:     Frequency of Communication with Friends and Family:     Frequency of Social Gatherings with Friends and Family:     Attends Catholic Services:     Active Member of Clubs or Organizations:     Attends Club or Organization Meetings:     Marital Status:      Family History   Problem Relation Age of Onset    Hypertension Mother     Diabetes Father     Breast Cancer Paternal Aunt      Current Outpatient Medications   Medication Sig Dispense Refill    NIFEdipine ER (ADALAT CC) 60 mg ER tablet Take 1 Tablet by mouth daily for 270 days. 90 Tablet 2    loratadine (Claritin) 10 mg tablet Take 10 mg by mouth daily. No Known Allergies    Objective:  Visit Vitals  /81 (BP 1 Location: Right arm, BP Patient Position: Sitting)   Pulse 81   Resp 16   Ht 5' 1\" (1.549 m)   Wt 231 lb (104.8 kg)   SpO2 99%   BMI 43.65 kg/m²     Physical Exam:   Physical Exam  Constitutional:       General: She is not in acute distress. Appearance: Normal appearance. She is morbidly obese. HENT:      Head: Normocephalic and atraumatic. Mouth/Throat:      Mouth: Mucous membranes are moist.   Eyes:      Extraocular Movements: Extraocular movements intact. Conjunctiva/sclera: Conjunctivae normal.      Pupils: Pupils are equal, round, and reactive to light. Cardiovascular:      Rate and Rhythm: Normal rate and regular rhythm. Pulses: Normal pulses. Heart sounds: Normal heart sounds. Pulmonary:      Effort: Pulmonary effort is normal.      Breath sounds: Normal breath sounds. Abdominal:      General: Abdomen is flat. Bowel sounds are normal. There is no distension. Palpations: Abdomen is soft. There is no mass. Tenderness: There is no abdominal tenderness. Musculoskeletal:         General: No swelling or deformity. Cervical back: Normal range of motion and neck supple. Right lower leg: No edema. Left lower leg: No edema. Lymphadenopathy:      Cervical: No cervical adenopathy. Skin:     General: Skin is warm and dry. Capillary Refill: Capillary refill takes less than 2 seconds. Coloration: Skin is not jaundiced or pale. Findings: No erythema or rash. Neurological:      General: No focal deficit present. Mental Status: She is alert and oriented to person, place, and time. Psychiatric:         Mood and Affect: Mood normal.         Behavior: Behavior normal.         Thought Content: Thought content normal.         Judgment: Judgment normal.          Results for orders placed or performed in visit on 03/23/21   LIPID PANEL   Result Value Ref Range    Cholesterol, total 227 (H) 100 - 199 mg/dL    Triglyceride 45 0 - 149 mg/dL    HDL Cholesterol 70 >39 mg/dL    VLDL, calculated 8 5 - 40 mg/dL    LDL, calculated 149 (H) 0 - 99 mg/dL   METABOLIC PANEL, COMPREHENSIVE   Result Value Ref Range    Glucose 144 (H) 65 - 99 mg/dL    BUN 9 6 - 24 mg/dL    Creatinine 0.55 (L) 0.57 - 1.00 mg/dL    GFR est non- >59 mL/min/1.73    GFR est  >59 mL/min/1.73    BUN/Creatinine ratio 16 9 - 23    Sodium 140 134 - 144 mmol/L    Potassium 4.0 3.5 - 5.2 mmol/L    Chloride 105 96 - 106 mmol/L    CO2 22 20 - 29 mmol/L    Calcium 9.4 8.7 - 10.2 mg/dL    Protein, total 7.1 6.0 - 8.5 g/dL    Albumin 4.5 3.8 - 4.8 g/dL    GLOBULIN, TOTAL 2.6 1.5 - 4.5 g/dL    A-G Ratio 1.7 1.2 - 2.2    Bilirubin, total 0.3 0.0 - 1.2 mg/dL    Alk.  phosphatase 90 39 - 117 IU/L AST (SGOT) 17 0 - 40 IU/L    ALT (SGPT) 17 0 - 32 IU/L   T4, FREE   Result Value Ref Range    T4, Free 1.14 0.82 - 1.77 ng/dL   TSH 3RD GENERATION   Result Value Ref Range    TSH 0.990 0.450 - 4.500 uIU/mL   CBC WITH AUTOMATED DIFF   Result Value Ref Range    WBC 4.1 3.4 - 10.8 x10E3/uL    RBC 4.45 3.77 - 5.28 x10E6/uL    HGB 13.9 11.1 - 15.9 g/dL    HCT 41.9 34.0 - 46.6 %    MCV 94 79 - 97 fL    MCH 31.2 26.6 - 33.0 pg    MCHC 33.2 31.5 - 35.7 g/dL    RDW 11.2 (L) 11.7 - 15.4 %    PLATELET 891 576 - 093 x10E3/uL    NEUTROPHILS 51 Not Estab. %    Lymphocytes 42 Not Estab. %    MONOCYTES 6 Not Estab. %    EOSINOPHILS 1 Not Estab. %    BASOPHILS 0 Not Estab. %    ABS. NEUTROPHILS 2.0 1.4 - 7.0 x10E3/uL    Abs Lymphocytes 1.7 0.7 - 3.1 x10E3/uL    ABS. MONOCYTES 0.3 0.1 - 0.9 x10E3/uL    ABS. EOSINOPHILS 0.0 0.0 - 0.4 x10E3/uL    ABS. BASOPHILS 0.0 0.0 - 0.2 x10E3/uL    IMMATURE GRANULOCYTES 0 Not Estab. %    ABS. IMM. GRANS. 0.0 0.0 - 0.1 x10E3/uL   HEPATITIS C AB   Result Value Ref Range    Hep C Virus Ab <0.1 0.0 - 0.9 s/co ratio   HEMOGLOBIN A1C WITH EAG   Result Value Ref Range    Hemoglobin A1c 4.9 4.8 - 5.6 %    Estimated average glucose 94 mg/dL       Assessment/Plan:    Lissette Niece is doing well, asymptomatic, hypertension well controlled, no need to repeat labs at this moment. Encouraged her to follow diet recommendations, continue exercising, weight loss. She did pap smear recently, she says it was normal   Had 1 dose of Moderna covid vaccine yesterday      ICD-10-CM ICD-9-CM    1. Morbid obesity ([address]  E66.01 278.01    2. Essential hypertension  I10 401.9 NIFEdipine ER (ADALAT CC) 60 mg ER tablet     Orders Placed This Encounter    NIFEdipine ER (ADALAT CC) 60 mg ER tablet     Sig: Take 1 Tablet by mouth daily for 270 days. Dispense:  90 Tablet     Refill:  2     lose weight, increase physical activity, call if any problems  There are no Patient Instructions on file for this visit.    Follow-up and Dispositions    · Return in about 6 months (around 12/15/2021) for hypertension.

## 2021-06-15 NOTE — PROGRESS NOTES
Chief Complaint   Patient presents with    Follow Up Chronic Condition    Hypertension     Visit Vitals  /81 (BP 1 Location: Right arm, BP Patient Position: Sitting)   Pulse 81   Resp 16   Ht 5' 1\" (1.549 m)   Wt 231 lb (104.8 kg)   SpO2 99%   BMI 43.65 kg/m²     1. Have you been to the ER, urgent care clinic since your last visit? Hospitalized since your last visit? No    2. Have you seen or consulted any other health care providers outside of the [address] [place] [address] since your last visit? Include any pap smears or colon screening.  No

## 2021-11-02 ENCOUNTER — OFFICE VISIT (OUTPATIENT)
Dept: INTERNAL MEDICINE CLINIC | Age: 51
End: 2021-11-02
Payer: COMMERCIAL

## 2021-11-02 VITALS
WEIGHT: 233.4 LBS | DIASTOLIC BLOOD PRESSURE: 81 MMHG | HEART RATE: 72 BPM | TEMPERATURE: 97.8 F | RESPIRATION RATE: 18 BRPM | OXYGEN SATURATION: 99 % | SYSTOLIC BLOOD PRESSURE: 132 MMHG | BODY MASS INDEX: 42.95 KG/M2 | HEIGHT: 62 IN

## 2021-11-02 DIAGNOSIS — Z12.11 COLON CANCER SCREENING: ICD-10-CM

## 2021-11-02 DIAGNOSIS — I10 ESSENTIAL HYPERTENSION: ICD-10-CM

## 2021-11-02 DIAGNOSIS — E66.01 MORBID OBESITY (HCC): Primary | ICD-10-CM

## 2021-11-02 DIAGNOSIS — Z23 ENCOUNTER FOR IMMUNIZATION: ICD-10-CM

## 2021-11-02 PROCEDURE — 99214 OFFICE O/P EST MOD 30 MIN: CPT | Performed by: INTERNAL MEDICINE

## 2021-11-02 PROCEDURE — 90471 IMMUNIZATION ADMIN: CPT | Performed by: INTERNAL MEDICINE

## 2021-11-02 PROCEDURE — 90715 TDAP VACCINE 7 YRS/> IM: CPT | Performed by: INTERNAL MEDICINE

## 2021-11-02 RX ORDER — PHENTERMINE HYDROCHLORIDE 37.5 MG/1
37.5 TABLET ORAL
Qty: 30 TABLET | Refills: 2 | Status: SHIPPED | OUTPATIENT
Start: 2021-11-02 | End: 2022-01-31

## 2021-11-02 NOTE — PATIENT INSTRUCTIONS
Phentermine (By mouth)   Phentermine (FEN-ter-meen)  Helps you lose weight when used for a short time. Brand Name(s): Adipex-P, Lomaira   There may be other brand names for this medicine. When This Medicine Should Not Be Used: This medicine is not right for everyone. Do not use it if you had an allergic reaction to phentermine or similar medicines, or if you are pregnant. How to Use This Medicine:   Dissolving Tablet, Capsule, Long Acting Capsule, Tablet, Dissolving Tablet  · Your doctor will tell you how much medicine to use. Do not use more than directed. · This medicine is not for long-term use. · To avoid trouble sleeping, always take this medicine in the morning and never at bedtime or late in the evening. ¨ Take the capsule 2 hours after breakfast.  ¨ Take the extended-release capsule before breakfast.  ¨ Take the disintegrating tablet in the morning, with or without food. ¨ Take the phentermine tablet before breakfast or 1 to 2 hours after breakfast.  ¨ Take Lomaira tablet 30 minutes before meals. · Swallow the extended-release capsule whole. Do not crush, break, or chew it. · If you are using the disintegrating tablet, make sure your hands are dry before you handle the tablet. Place the tablet on your tongue. It should melt quickly. After the tablet has melted, swallow or take a drink of water. · Tablet: Swallow whole. Do not crush, break, or chew it. · Carefully follow your doctor's instructions about any special diet. · Missed dose: Take a dose as soon as you remember. If it is almost time for your next dose, wait until then and take a regular dose. Do not take extra medicine to make up for a missed dose. · Store the medicine in a closed container at room temperature, away from heat, moisture, and direct light. Drugs and Foods to Avoid:   Ask your doctor or pharmacist before using any other medicine, including over-the-counter medicines, vitamins, and herbal products.   · Do not use this medicine and an MAO inhibitor (MAOI) within 14 days of each other. · Some medicines can affect how phentermine works. Tell your doctor if you are using any of the following:  ¨ Amphetamine medicine (including dextroamphetamine, methamphetamine)  ¨ Diet pills  ¨ Insulin or diabetes medicine  ¨ Medicine to treat depression (including fluoxetine, fluvoxamine, paroxetine, sertraline)  · Do not drink alcohol while you are using this medicine. Warnings While Using This Medicine:   · It is not safe to take this medicine during pregnancy. It could harm an unborn baby. Tell your doctor right away if you become pregnant. · Tell your doctor if you are breastfeeding, or if you have kidney disease, diabetes, glaucoma, congestive heart failure, heart or blood vessel disease, high blood pressure, overactive thyroid, or a history of stroke or drug abuse. Tell your doctor if have allergies to aspirin or tartrazine. · This medicine may cause the following problems:  ¨ Primary pulmonary hypertension (a serious lung problem)  ¨ Heart valve disease  ¨ Changes in blood sugar levels  · This medicine may make you dizzy or drowsy. Do not drive or do anything else that could be dangerous until you know how this medicine affects you. · This medicine can be habit-forming. Do not use more than your prescribed dose. Call your doctor if you think your medicine is not working. · Do not stop using this medicine suddenly. Your doctor will need to slowly decrease your dose before you stop it completely. · Your doctor will check your progress and the effects of this medicine at regular visits. Keep all appointments. · Keep all medicine out of the reach of children. Never share your medicine with anyone.   Possible Side Effects While Using This Medicine:   Call your doctor right away if you notice any of these side effects:  · Allergic reaction: Itching or hives, swelling in your face or hands, swelling or tingling in your mouth or throat, chest tightness, trouble breathing  · Chest pain, fainting, trouble breathing  · Fast, slow, pounding, or uneven heartbeat  · Seizures or tremors  · Severe headache  · Swelling of your feet or lower legs  If you notice these less serious side effects, talk with your doctor:   · Changes in sex drive  · Dizziness, drowsiness, mild headache  · Dry mouth or a bad taste in your mouth  · Nausea, vomiting, diarrhea, constipation, stomach cramps  · Restlessness or nervousness, trouble sleeping  If you notice other side effects that you think are caused by this medicine, tell your doctor. Call your doctor for medical advice about side effects. You may report side effects to FDA at 8-770-FDA-3937  © 2017 [facility] Information is for End User's use only and may not be sold, redistributed or otherwise used for commercial purposes. The above information is an  only. It is not intended as medical advice for individual conditions or treatments. Talk to your doctor, nurse or pharmacist before following any medical regimen to see if it is safe and effective for you.

## 2021-11-02 NOTE — PROGRESS NOTES
1. Have you been to the ER, urgent care clinic since your last visit? Hospitalized since your last visit? No    2. Have you seen or consulted any other health care providers outside of the [address] [place] [address] since your last visit? Include any pap smears or colon screening.  No     Chief Complaint   Patient presents with    Follow-up    Hypertension    Morbid Obesity     Pt states that she is here for a f/u visit

## 2021-11-02 NOTE — PROGRESS NOTES
Linda Calero is a 54 y.o. female and presents with Follow-up, Hypertension, and Morbid Obesity    Allan Bell is feeling well, she has not achieved weight loss goals since out last visit, HTN is well controlled, she's taking the nifedipine. She says she struggles with portion control   No ER visits, ROS negative a sbelow     Review of Systems  Review of Systems   Constitutional: Negative for chills, fatigue, fever and unexpected weight change. HENT: Negative for congestion, ear pain, sneezing and sore throat. Eyes: Negative for pain and discharge. Respiratory: Negative for cough, shortness of breath and wheezing. Cardiovascular: Negative for chest pain, palpitations and leg swelling. Gastrointestinal: Negative for abdominal pain, blood in stool, constipation and diarrhea. Endocrine: Negative for polydipsia and polyuria. Genitourinary: Negative for difficulty urinating, dysuria, frequency, hematuria and urgency. Musculoskeletal: Negative for arthralgias, back pain and joint swelling. Skin: Negative for rash. Allergic/Immunologic: Negative for environmental allergies and food allergies. Neurological: Negative for dizziness, speech difficulty, weakness, light-headedness, numbness and headaches. Hematological: Negative for adenopathy. Psychiatric/Behavioral: Negative for behavioral problems (Depression), sleep disturbance and suicidal ideas.           Past Medical History:   Diagnosis Date    Hypertension      Past Surgical History:   Procedure Laterality Date    HX CHOLECYSTECTOMY      HX HYSTERECTOMY       Social History     Socioeconomic History    Marital status: SINGLE   Tobacco Use    Smoking status: Never Smoker    Smokeless tobacco: Never Used   Vaping Use    Vaping Use: Never used   Substance and Sexual Activity    Alcohol use: Yes     Comment: occasional    Drug use: Never     Family History   Problem Relation Age of Onset    Hypertension Mother     Diabetes Father    [facility] Breast Cancer Paternal Aunt      Current Outpatient Medications   Medication Sig Dispense Refill    phentermine (ADIPEX-P) 37.5 mg tablet Take 1 Tablet by mouth every morning for 90 days. Max Daily Amount: 37.5 mg. Indications: weight loss management for an obese person 30 Tablet 2    NIFEdipine ER (ADALAT CC) 60 mg ER tablet Take 1 Tablet by mouth daily for 270 days. 90 Tablet 2    loratadine (Claritin) 10 mg tablet Take 10 mg by mouth daily. No Known Allergies    Objective:  Visit Vitals  /81 (BP 1 Location: Right upper arm, BP Patient Position: Sitting, BP Cuff Size: Adult)   Pulse 72   Temp 97.8 °F (36.6 °C) (Oral)   Resp 18   Ht 5' 2\" (1.575 m)   Wt 233 lb 6.4 oz (105.9 kg)   SpO2 99% Comment: RA   BMI 42.69 kg/m²     Physical Exam:   Physical Exam  Constitutional:       General: She is not in acute distress. Appearance: Normal appearance. HENT:      Head: Normocephalic and atraumatic. Mouth/Throat:      Mouth: Mucous membranes are moist.   Eyes:      Extraocular Movements: Extraocular movements intact. Conjunctiva/sclera: Conjunctivae normal.      Pupils: Pupils are equal, round, and reactive to light. Cardiovascular:      Rate and Rhythm: Normal rate and regular rhythm. Pulses: Normal pulses. Heart sounds: Normal heart sounds. Pulmonary:      Effort: Pulmonary effort is normal.      Breath sounds: Normal breath sounds. Abdominal:      General: Abdomen is flat. Bowel sounds are normal. There is no distension. Palpations: Abdomen is soft. There is no mass. Tenderness: There is no abdominal tenderness. Musculoskeletal:         General: No swelling or deformity. Cervical back: Normal range of motion and neck supple. Right lower leg: No edema. Left lower leg: No edema. Lymphadenopathy:      Cervical: No cervical adenopathy. Skin:     General: Skin is warm and dry. Capillary Refill: Capillary refill takes less than 2 seconds. Coloration: Skin is not jaundiced or pale. Findings: No erythema or rash. Neurological:      General: No focal deficit present. Mental Status: She is alert and oriented to person, place, and time. Psychiatric:         Mood and Affect: Mood normal.         Behavior: Behavior normal.         Thought Content: Thought content normal.         Judgment: Judgment normal.          Results for orders placed or performed in visit on 03/23/21   LIPID PANEL   Result Value Ref Range    Cholesterol, total 227 (H) 100 - 199 mg/dL    Triglyceride 45 0 - 149 mg/dL    HDL Cholesterol 70 >39 mg/dL    VLDL, calculated 8 5 - 40 mg/dL    LDL, calculated 149 (H) 0 - 99 mg/dL   METABOLIC PANEL, COMPREHENSIVE   Result Value Ref Range    Glucose 144 (H) 65 - 99 mg/dL    BUN 9 6 - 24 mg/dL    Creatinine 0.55 (L) 0.57 - 1.00 mg/dL    GFR est non- >59 mL/min/1.73    GFR est  >59 mL/min/1.73    BUN/Creatinine ratio 16 9 - 23    Sodium 140 134 - 144 mmol/L    Potassium 4.0 3.5 - 5.2 mmol/L    Chloride 105 96 - 106 mmol/L    CO2 22 20 - 29 mmol/L    Calcium 9.4 8.7 - 10.2 mg/dL    Protein, total 7.1 6.0 - 8.5 g/dL    Albumin 4.5 3.8 - 4.8 g/dL    GLOBULIN, TOTAL 2.6 1.5 - 4.5 g/dL    A-G Ratio 1.7 1.2 - 2.2    Bilirubin, total 0.3 0.0 - 1.2 mg/dL    Alk.  phosphatase 90 39 - 117 IU/L    AST (SGOT) 17 0 - 40 IU/L    ALT (SGPT) 17 0 - 32 IU/L   T4, FREE   Result Value Ref Range    T4, Free 1.14 0.82 - 1.77 ng/dL   TSH 3RD GENERATION   Result Value Ref Range    TSH 0.990 0.450 - 4.500 uIU/mL   CBC WITH AUTOMATED DIFF   Result Value Ref Range    WBC 4.1 3.4 - 10.8 x10E3/uL    RBC 4.45 3.77 - 5.28 x10E6/uL    HGB 13.9 11.1 - 15.9 g/dL    HCT 41.9 34.0 - 46.6 %    MCV 94 79 - 97 fL    MCH 31.2 26.6 - 33.0 pg    MCHC 33.2 31.5 - 35.7 g/dL    RDW 11.2 (L) 11.7 - 15.4 %    PLATELET 824 651 - 592 x10E3/uL    NEUTROPHILS 51 Not Estab. %    Lymphocytes 42 Not Estab. %    MONOCYTES 6 Not Estab. %    EOSINOPHILS 1 Not Estab. % BASOPHILS 0 Not Estab. %    ABS. NEUTROPHILS 2.0 1.4 - 7.0 x10E3/uL    Abs Lymphocytes 1.7 0.7 - 3.1 x10E3/uL    ABS. MONOCYTES 0.3 0.1 - 0.9 x10E3/uL    ABS. EOSINOPHILS 0.0 0.0 - 0.4 x10E3/uL    ABS. BASOPHILS 0.0 0.0 - 0.2 x10E3/uL    IMMATURE GRANULOCYTES 0 Not Estab. %    ABS. IMM. GRANS. 0.0 0.0 - 0.1 x10E3/uL   HEPATITIS C AB   Result Value Ref Range    Hep C Virus Ab <0.1 0.0 - 0.9 s/co ratio   HEMOGLOBIN A1C WITH EAG   Result Value Ref Range    Hemoglobin A1c 4.9 4.8 - 5.6 %    Estimated average glucose 94 mg/dL       Assessment/Plan:    HTN is well controlled, we can do a trial of appetite suppressant for 1 month, discussed goals, potential side effects, safety profile, need to follow diet and exercise       ICD-10-CM ICD-9-CM    1. Morbid obesity (HCC)  E66.01 278.01 phentermine (ADIPEX-P) 37.5 mg tablet   2. Essential hypertension  I10 401.9    3. Encounter for immunization  Z23 V03.89 TETANUS, DIPHTHERIA TOXOIDS AND ACELLULAR PERTUSSIS VACCINE (TDAP), IN INDIVIDS. >=7, IM   4. Colon cancer screening  Z12.11 V76.51 REFERRAL TO GASTROENTEROLOGY     Orders Placed This Encounter    TETANUS, DIPHTHERIA TOXOIDS AND ACELLULAR PERTUSSIS VACCINE (TDAP), IN INDIVIDS. >=7, IM    REFERRAL TO GASTROENTEROLOGY     Referral Priority:   Routine     Referral Type:   Consultation     Referral Reason:   Specialty Services Required     Referred to Provider:   Hermann Hodges MD     Number of Visits Requested:   1    phentermine (ADIPEX-P) 37.5 mg tablet     Sig: Take 1 Tablet by mouth every morning for 90 days. Max Daily Amount: 37.5 mg. Indications: weight loss management for an obese person     Dispense:  30 Tablet     Refill:  2     lose weight, increase physical activity, follow low fat diet, follow low salt diet, call if any problems  Patient Instructions   Phentermine (By mouth)   Phentermine (FEN-ter-meen)  Helps you lose weight when used for a short time. Brand Name(s):  Adipex-P, Lomaira   There may be other brand names for this medicine. When This Medicine Should Not Be Used: This medicine is not right for everyone. Do not use it if you had an allergic reaction to phentermine or similar medicines, or if you are pregnant. How to Use This Medicine:   Dissolving Tablet, Capsule, Long Acting Capsule, Tablet, Dissolving Tablet  · Your doctor will tell you how much medicine to use. Do not use more than directed. · This medicine is not for long-term use. · To avoid trouble sleeping, always take this medicine in the morning and never at bedtime or late in the evening. ¨ Take the capsule 2 hours after breakfast.  ¨ Take the extended-release capsule before breakfast.  ¨ Take the disintegrating tablet in the morning, with or without food. ¨ Take the phentermine tablet before breakfast or 1 to 2 hours after breakfast.  ¨ Take Lomaira tablet 30 minutes before meals. · Swallow the extended-release capsule whole. Do not crush, break, or chew it. · If you are using the disintegrating tablet, make sure your hands are dry before you handle the tablet. Place the tablet on your tongue. It should melt quickly. After the tablet has melted, swallow or take a drink of water. · Tablet: Swallow whole. Do not crush, break, or chew it. · Carefully follow your doctor's instructions about any special diet. · Missed dose: Take a dose as soon as you remember. If it is almost time for your next dose, wait until then and take a regular dose. Do not take extra medicine to make up for a missed dose. · Store the medicine in a closed container at room temperature, away from heat, moisture, and direct light. Drugs and Foods to Avoid:   Ask your doctor or pharmacist before using any other medicine, including over-the-counter medicines, vitamins, and herbal products. · Do not use this medicine and an MAO inhibitor (MAOI) within 14 days of each other. · Some medicines can affect how phentermine works.  Tell your doctor if you are using any of the following:  ¨ Amphetamine medicine (including dextroamphetamine, methamphetamine)  ¨ Diet pills  ¨ Insulin or diabetes medicine  ¨ Medicine to treat depression (including fluoxetine, fluvoxamine, paroxetine, sertraline)  · Do not drink alcohol while you are using this medicine. Warnings While Using This Medicine:   · It is not safe to take this medicine during pregnancy. It could harm an unborn baby. Tell your doctor right away if you become pregnant. · Tell your doctor if you are breastfeeding, or if you have kidney disease, diabetes, glaucoma, congestive heart failure, heart or blood vessel disease, high blood pressure, overactive thyroid, or a history of stroke or drug abuse. Tell your doctor if have allergies to aspirin or tartrazine. · This medicine may cause the following problems:  ¨ Primary pulmonary hypertension (a serious lung problem)  ¨ Heart valve disease  ¨ Changes in blood sugar levels  · This medicine may make you dizzy or drowsy. Do not drive or do anything else that could be dangerous until you know how this medicine affects you. · This medicine can be habit-forming. Do not use more than your prescribed dose. Call your doctor if you think your medicine is not working. · Do not stop using this medicine suddenly. Your doctor will need to slowly decrease your dose before you stop it completely. · Your doctor will check your progress and the effects of this medicine at regular visits. Keep all appointments. · Keep all medicine out of the reach of children. Never share your medicine with anyone.   Possible Side Effects While Using This Medicine:   Call your doctor right away if you notice any of these side effects:  · Allergic reaction: Itching or hives, swelling in your face or hands, swelling or tingling in your mouth or throat, chest tightness, trouble breathing  · Chest pain, fainting, trouble breathing  · Fast, slow, pounding, or uneven heartbeat  · Seizures or tremors  · Severe headache  · Swelling of your feet or lower legs  If you notice these less serious side effects, talk with your doctor:   · Changes in sex drive  · Dizziness, drowsiness, mild headache  · Dry mouth or a bad taste in your mouth  · Nausea, vomiting, diarrhea, constipation, stomach cramps  · Restlessness or nervousness, trouble sleeping  If you notice other side effects that you think are caused by this medicine, tell your doctor. Call your doctor for medical advice about side effects. You may report side effects to FDA at 6-002-IGP-8883  © 2017 [facility] Information is for End User's use only and may not be sold, redistributed or otherwise used for commercial purposes. The above information is an  only. It is not intended as medical advice for individual conditions or treatments. Talk to your doctor, nurse or pharmacist before following any medical regimen to see if it is safe and effective for you. Follow-up and Dispositions    · Return in about 3 months (around 2/2/2022) for obesity, hypertension.

## 2021-11-08 ENCOUNTER — TELEPHONE (OUTPATIENT)
Dept: INTERNAL MEDICINE CLINIC | Age: 51
End: 2021-11-08

## 2021-11-08 NOTE — TELEPHONE ENCOUNTER
Can you do a PA for the patients phentermine she said that she has taken it before and it was covered by her insurance.

## 2022-02-25 ENCOUNTER — OFFICE VISIT (OUTPATIENT)
Dept: OBGYN CLINIC | Age: 52
End: 2022-02-25
Payer: COMMERCIAL

## 2022-02-25 VITALS
BODY MASS INDEX: 40.52 KG/M2 | SYSTOLIC BLOOD PRESSURE: 125 MMHG | HEIGHT: 62 IN | HEART RATE: 86 BPM | TEMPERATURE: 97.3 F | WEIGHT: 220.19 LBS | DIASTOLIC BLOOD PRESSURE: 81 MMHG | OXYGEN SATURATION: 99 %

## 2022-02-25 DIAGNOSIS — Z12.31 ENCOUNTER FOR SCREENING MAMMOGRAM FOR MALIGNANT NEOPLASM OF BREAST: ICD-10-CM

## 2022-02-25 DIAGNOSIS — Z01.419 ROUTINE GYNECOLOGICAL EXAMINATION: Primary | ICD-10-CM

## 2022-02-25 PROCEDURE — 99396 PREV VISIT EST AGE 40-64: CPT | Performed by: OBSTETRICS & GYNECOLOGY

## 2022-02-25 NOTE — PROGRESS NOTES
1. \"Have you been to the ER, urgent care clinic since your last visit? Hospitalized since your last visit? \" No    2. \"Have you seen or consulted any other health care providers outside of the [address] [place] [address] since your last visit? \"  No    3. For patients aged 39-70: Has the patient had a colonoscopy? No    If the patient is female:    4. For patients aged 41-77: Has the patient had a mammogram within the past 2 years? Yes 03/12/2021    5. For patients aged 21-65: Has the patient had a pap smear?  No

## 2022-02-25 NOTE — PROGRESS NOTES
Herman Martell is a 54 y.o. female who presents today for the following:  Chief Complaint   Patient presents with    Annual Exam        No Known Allergies    Current Outpatient Medications   Medication Sig    NIFEdipine ER (ADALAT CC) 60 mg ER tablet Take 1 Tablet by mouth daily for 270 days.  loratadine (Claritin) 10 mg tablet Take 10 mg by mouth daily. No current facility-administered medications for this visit. Past Medical History:   Diagnosis Date    Hypertension        Past Surgical History:   Procedure Laterality Date    HX CHOLECYSTECTOMY      HX HYSTERECTOMY         Family History   Problem Relation Age of Onset    Hypertension Mother     Diabetes Father     Breast Cancer Paternal Aunt        Social History     Socioeconomic History    Marital status: SINGLE     Spouse name: Not on file    Number of children: Not on file    Years of education: Not on file    Highest education level: Not on file   Occupational History    Not on file   Tobacco Use    Smoking status: Never Smoker    Smokeless tobacco: Never Used   Vaping Use    Vaping Use: Never used   Substance and Sexual Activity    Alcohol use: Yes     Comment: occasional    Drug use: Never    Sexual activity: Not on file   Other Topics Concern    Not on file   Social History Narrative    Not on file     Social Determinants of Health     Financial Resource Strain:     Difficulty of Paying Living Expenses: Not on file   Food Insecurity:     Worried About Running Out of Food in the Last Year: Not on file    Rodriguez of Food in the Last Year: Not on file   Transportation Needs:     Lack of Transportation (Medical): Not on file    Lack of Transportation (Non-Medical):  Not on file   Physical Activity:     Days of Exercise per Week: Not on file    Minutes of Exercise per Session: Not on file   Stress:     Feeling of Stress : Not on file   Social Connections:     Frequency of Communication with Friends and Family: Not on file    Frequency of Social Gatherings with Friends and Family: Not on file    Attends Rastafarian Services: Not on file    Active Member of Clubs or Organizations: Not on file    Attends Club or Organization Meetings: Not on file    Marital Status: Not on file   Intimate Partner Violence:     Fear of Current or Ex-Partner: Not on file    Emotionally Abused: Not on file    Physically Abused: Not on file    Sexually Abused: Not on file   Housing Stability:     Unable to Pay for Housing in the Last Year: Not on file    Number of [place] in the Last Year: Not on file    Unstable Housing in the Last Year: Not on file         HPI  Here today for annual exam. Patient has history of hysterectomy. ROS   Review of Systems   Constitutional: Negative. HENT: Negative. Eyes: Negative. Respiratory: Negative. Cardiovascular: Negative. Gastrointestinal: Negative. Genitourinary: Negative. Musculoskeletal: Negative. Skin: Negative. Neurological: Negative. Endo/Heme/Allergies: Negative. Psychiatric/Behavioral: Negative. /81 (BP 1 Location: Left upper arm, BP Patient Position: Sitting, BP Cuff Size: Adult)   Pulse 86   Temp 97.3 °F (36.3 °C) (Temporal)   Ht 5' 2\" (1.575 m)   Wt 220 lb 3 oz (99.9 kg)   SpO2 99%   BMI 40.27 kg/m²    OBGyn Exam   Constitutional  · Appearance: well-nourished, well developed, alert, in no acute distress    HENT  · Head and Face: appears normal    Neck  · Inspection/Palpation: normal appearance, no masses or tenderness  · Lymph Nodes: no lymphadenopathy present  · Thyroid: gland size normal, nontender, no nodules or masses present on palpation    Breasts   Symmetric, no palpable masses, no tenderness, no skin changes, no nipple abnormality, no nipple discharge, no lymphadenopathy.     Chest  · Respiratory Effort: Even and unlabored  · Auscultation: normal breath sounds    Cardiovascular  · Heart:  · Auscultation: regular rate and rhythm without murmur    Gastrointestinal  · Abdominal Examination: abdomen non-tender to palpation, normal bowel sounds, no masses present  · Liver and spleen: no hepatomegaly present, spleen not palpable  · Hernias: no hernias identified    Genitourinary  · External Genitalia: normal appearance for age, no discharge present, no tenderness present, no inflammatory lesions present, no masses present, no atrophy present  · Vagina: normal vaginal vault without central or paravaginal defects, no discharge present, no inflammatory lesions present, no masses present  · Bladder: non-tender to palpation  · Urethra: appears normal  · Cervix: Absent   · Uterus: Absent  · Adnexa: no adnexal tenderness present, no adnexal masses present  · Perineum: perineum within normal limits, no evidence of trauma, no rashes or skin lesions present  · Anus: anus within normal limits, no hemorrhoids present  · Inguinal Lymph Nodes: no lymphadenopathy present    Skin  · General Inspection: no rash, no lesions identified    Neurologic/Psychiatric  · Mental Status:  · Orientation: grossly oriented to person, place and time  · Mood and Affect: mood normal, affect appropriate    No results found for this visit on 02/25/22. Orders Placed This Encounter    TORSTEN 3D AMIRA W MAMMO BI SCREENING INCL CAD     Standing Status:   Future     Standing Expiration Date:   2/25/2023     Order Specific Question:   Is Patient Pregnant? Answer:   No     Order Specific Question:   Reason for Exam     Answer:   Screening         1. Routine gynecological examination  Reviewed Pap smear/HPV, mammogram, bone density colonoscopy testing guidelines. Encouraged healthy lifestyle. Discussed importanceof getting annual exams. Discussed the risk of osteoporosis and recommended 1200 to 1500 mg of calcium as well as vitamin D supplementation daily.   Recommended monthly self breast exams and instructed and demonstrated to the patient on the proper technique educated on the importance of healthy weight management and the significance of not smoking. 2. Encounter for screening mammogram for malignant neoplasm of breast    - TORSTEN 3D AMIRA W MAMMO BI SCREENING INCL CAD;  Future        Follow-up and Dispositions    · Return in about 1 year (around 2/25/2023) for Annual Exam.

## 2022-03-10 DIAGNOSIS — I10 ESSENTIAL HYPERTENSION: ICD-10-CM

## 2022-03-14 RX ORDER — NIFEDIPINE 60 MG/1
TABLET, EXTENDED RELEASE ORAL
Qty: 90 TABLET | Refills: 2 | Status: SHIPPED | OUTPATIENT
Start: 2022-03-14

## 2022-03-19 PROBLEM — E66.01 MORBID OBESITY (HCC): Status: ACTIVE | Noted: 2020-08-12

## 2022-03-19 PROBLEM — M71.21 SYNOVIAL CYST OF RIGHT POPLITEAL SPACE: Status: ACTIVE | Noted: 2020-08-12

## 2022-03-19 PROBLEM — M79.89 SWELLING OF LOWER LEG: Status: ACTIVE | Noted: 2020-08-12

## 2022-03-19 PROBLEM — I10 ESSENTIAL HYPERTENSION: Status: ACTIVE | Noted: 2020-08-12

## 2022-04-11 ENCOUNTER — HOSPITAL ENCOUNTER (OUTPATIENT)
Dept: MAMMOGRAPHY | Age: 52
Discharge: HOME | End: 2022-04-11
Attending: OBSTETRICS & GYNECOLOGY
Payer: COMMERCIAL

## 2022-04-11 DIAGNOSIS — Z12.31 ENCOUNTER FOR SCREENING MAMMOGRAM FOR MALIGNANT NEOPLASM OF BREAST: ICD-10-CM

## 2022-04-11 PROCEDURE — 77063 BREAST TOMOSYNTHESIS BI: CPT

## 2022-06-27 ENCOUNTER — OFFICE VISIT (OUTPATIENT)
Dept: INTERNAL MEDICINE CLINIC | Age: 52
End: 2022-06-27
Payer: COMMERCIAL

## 2022-06-27 VITALS
DIASTOLIC BLOOD PRESSURE: 76 MMHG | RESPIRATION RATE: 12 BRPM | OXYGEN SATURATION: 98 % | HEIGHT: 62 IN | WEIGHT: 217 LBS | SYSTOLIC BLOOD PRESSURE: 111 MMHG | HEART RATE: 72 BPM | BODY MASS INDEX: 39.93 KG/M2

## 2022-06-27 DIAGNOSIS — Z12.11 COLON CANCER SCREENING: Primary | ICD-10-CM

## 2022-06-27 DIAGNOSIS — Z23 ENCOUNTER FOR IMMUNIZATION: ICD-10-CM

## 2022-06-27 DIAGNOSIS — I10 ESSENTIAL HYPERTENSION: ICD-10-CM

## 2022-06-27 PROCEDURE — 90750 HZV VACC RECOMBINANT IM: CPT | Performed by: INTERNAL MEDICINE

## 2022-06-27 PROCEDURE — 90471 IMMUNIZATION ADMIN: CPT | Performed by: INTERNAL MEDICINE

## 2022-06-27 PROCEDURE — 99396 PREV VISIT EST AGE 40-64: CPT | Performed by: INTERNAL MEDICINE

## 2022-06-27 NOTE — PROGRESS NOTES
Chief Complaint   Patient presents with    Physical     Visit Vitals  /76 (BP 1 Location: Right upper arm, BP Patient Position: Sitting, BP Cuff Size: Adult)   Pulse 72   Resp 12   Ht 5' 2\" (1.575 m)   Wt 217 lb (98.4 kg)   SpO2 98%   BMI 39.69 kg/m²       1. \"Have you been to the ER, urgent care clinic since your last visit? Hospitalized since your last visit? \" No    2. \"Have you seen or consulted any other health care providers outside of the [address] [place] [address] since your last visit? \" No     3. For patients aged 39-70: Has the patient had a colonoscopy / FIT/ Cologuard? No      If the patient is female:    4. For patients aged 41-77: Has the patient had a mammogram within the past 2 years? Yes - Care Gap present. Most recent result on file      5. For patients aged 21-65: Has the patient had a pap smear? Yes - Care Gap present.  Most recent result on file

## 2022-06-27 NOTE — PROGRESS NOTES
Chito Wheatley is a 50 y.o. female and presents with Physical    Quemado Birks lost 16 lbs since our last visit, she continues walking, she has made significant changes to her diet. Her HTN is very well controlled. She feels well, no ER visits       Review of Systems  Review of Systems   Constitutional: Negative for chills, fatigue, fever and unexpected weight change. HENT: Negative for congestion, ear pain, sneezing and sore throat. Eyes: Negative for pain and discharge. Respiratory: Negative for cough, shortness of breath and wheezing. Cardiovascular: Negative for chest pain, palpitations and leg swelling. Gastrointestinal: Negative for abdominal pain, blood in stool, constipation and diarrhea. Endocrine: Negative for polydipsia and polyuria. Genitourinary: Negative for difficulty urinating, dysuria, frequency, hematuria and urgency. Musculoskeletal: Negative for arthralgias, back pain and joint swelling. Skin: Negative for rash. Allergic/Immunologic: Negative for environmental allergies and food allergies. Neurological: Negative for dizziness, speech difficulty, weakness, light-headedness, numbness and headaches. Hematological: Negative for adenopathy. Psychiatric/Behavioral: Negative for behavioral problems (Depression), sleep disturbance and suicidal ideas.           Past Medical History:   Diagnosis Date    Hypertension      Past Surgical History:   Procedure Laterality Date    HX CHOLECYSTECTOMY      HX HYSTERECTOMY       Social History     Socioeconomic History    Marital status: SINGLE   Tobacco Use    Smoking status: Never Smoker    Smokeless tobacco: Never Used   Vaping Use    Vaping Use: Never used   Substance and Sexual Activity    Alcohol use: Yes     Comment: occasional    Drug use: Never     Social Determinants of Health     Financial Resource Strain: Low Risk     Difficulty of Paying Living Expenses: Not hard at all   Food Insecurity: No Food Insecurity    Worried About Running Out of Food in the Last Year: Never true    Ran Out of Food in the Last Year: Never true     Family History   Problem Relation Age of Onset    Hypertension Mother     Diabetes Father     Breast Cancer Paternal Aunt      Current Outpatient Medications   Medication Sig Dispense Refill    NIFEdipine ER (ADALAT CC) 60 mg ER tablet TAKE 1 TABLET BY MOUTH EVERY DAY 90 Tablet 2    loratadine (Claritin) 10 mg tablet Take 10 mg by mouth daily. No Known Allergies    Objective:  Visit Vitals  /76 (BP 1 Location: Right upper arm, BP Patient Position: Sitting, BP Cuff Size: Adult)   Pulse 72   Resp 12   Ht 5' 2\" (1.575 m)   Wt 217 lb (98.4 kg)   SpO2 98%   BMI 39.69 kg/m²     Physical Exam:   Physical Exam  Constitutional:       General: She is not in acute distress. Appearance: Normal appearance. She is obese. HENT:      Head: Normocephalic and atraumatic. Mouth/Throat:      Mouth: Mucous membranes are moist.   Eyes:      Extraocular Movements: Extraocular movements intact. Conjunctiva/sclera: Conjunctivae normal.      Pupils: Pupils are equal, round, and reactive to light. Cardiovascular:      Rate and Rhythm: Normal rate and regular rhythm. Pulses: Normal pulses. Heart sounds: Normal heart sounds. Pulmonary:      Effort: Pulmonary effort is normal.      Breath sounds: Normal breath sounds. Abdominal:      General: Abdomen is flat. Bowel sounds are normal. There is no distension. Palpations: Abdomen is soft. There is no mass. Tenderness: There is no abdominal tenderness. Musculoskeletal:         General: No swelling or deformity. Cervical back: Normal range of motion and neck supple. Right lower leg: No edema. Left lower leg: No edema. Lymphadenopathy:      Cervical: No cervical adenopathy. Skin:     General: Skin is warm and dry. Capillary Refill: Capillary refill takes less than 2 seconds.       Coloration: Skin is not jaundiced or pale. Findings: No erythema or rash. Neurological:      General: No focal deficit present. Mental Status: She is alert and oriented to person, place, and time. Psychiatric:         Mood and Affect: Mood normal.         Behavior: Behavior normal.         Thought Content: Thought content normal.         Judgment: Judgment normal.          Results for orders placed or performed in visit on 03/23/21   LIPID PANEL   Result Value Ref Range    Cholesterol, total 227 (H) 100 - 199 mg/dL    Triglyceride 45 0 - 149 mg/dL    HDL Cholesterol 70 >39 mg/dL    VLDL, calculated 8 5 - 40 mg/dL    LDL, calculated 149 (H) 0 - 99 mg/dL   METABOLIC PANEL, COMPREHENSIVE   Result Value Ref Range    Glucose 144 (H) 65 - 99 mg/dL    BUN 9 6 - 24 mg/dL    Creatinine 0.55 (L) 0.57 - 1.00 mg/dL    GFR est non- >59 mL/min/1.73    GFR est  >59 mL/min/1.73    BUN/Creatinine ratio 16 9 - 23    Sodium 140 134 - 144 mmol/L    Potassium 4.0 3.5 - 5.2 mmol/L    Chloride 105 96 - 106 mmol/L    CO2 22 20 - 29 mmol/L    Calcium 9.4 8.7 - 10.2 mg/dL    Protein, total 7.1 6.0 - 8.5 g/dL    Albumin 4.5 3.8 - 4.8 g/dL    GLOBULIN, TOTAL 2.6 1.5 - 4.5 g/dL    A-G Ratio 1.7 1.2 - 2.2    Bilirubin, total 0.3 0.0 - 1.2 mg/dL    Alk.  phosphatase 90 39 - 117 IU/L    AST (SGOT) 17 0 - 40 IU/L    ALT (SGPT) 17 0 - 32 IU/L   T4, FREE   Result Value Ref Range    T4, Free 1.14 0.82 - 1.77 ng/dL   TSH 3RD GENERATION   Result Value Ref Range    TSH 0.990 0.450 - 4.500 uIU/mL   CBC WITH AUTOMATED DIFF   Result Value Ref Range    WBC 4.1 3.4 - 10.8 x10E3/uL    RBC 4.45 3.77 - 5.28 x10E6/uL    HGB 13.9 11.1 - 15.9 g/dL    HCT 41.9 34.0 - 46.6 %    MCV 94 79 - 97 fL    MCH 31.2 26.6 - 33.0 pg    MCHC 33.2 31.5 - 35.7 g/dL    RDW 11.2 (L) 11.7 - 15.4 %    PLATELET 348 736 - 796 x10E3/uL    NEUTROPHILS 51 Not Estab. %    Lymphocytes 42 Not Estab. %    MONOCYTES 6 Not Estab. %    EOSINOPHILS 1 Not Estab. %    BASOPHILS 0 Not Estab. % ABS. NEUTROPHILS 2.0 1.4 - 7.0 x10E3/uL    Abs Lymphocytes 1.7 0.7 - 3.1 x10E3/uL    ABS. MONOCYTES 0.3 0.1 - 0.9 x10E3/uL    ABS. EOSINOPHILS 0.0 0.0 - 0.4 x10E3/uL    ABS. BASOPHILS 0.0 0.0 - 0.2 x10E3/uL    IMMATURE GRANULOCYTES 0 Not Estab. %    ABS. IMM. GRANS. 0.0 0.0 - 0.1 x10E3/uL   HEPATITIS C AB   Result Value Ref Range    Hep C Virus Ab <0.1 0.0 - 0.9 s/co ratio   HEMOGLOBIN A1C WITH EAG   Result Value Ref Range    Hemoglobin A1c 4.9 4.8 - 5.6 %    Estimated average glucose 94 mg/dL       Assessment/Plan:    ICD-10-CM ICD-9-CM    1. Colon cancer screening  Z12.11 V76.51 COLOGUARD TEST (FECAL DNA COLORECTAL CANCER SCREENING)   2. Essential hypertension  I10 401.9 CBC WITH AUTOMATED DIFF      METABOLIC PANEL, COMPREHENSIVE      TSH 3RD GENERATION      LIPID PANEL     Orders Placed This Encounter    CBC WITH AUTOMATED DIFF    METABOLIC PANEL, COMPREHENSIVE    TSH 3RD GENERATION    LIPID PANEL    COLOGUARD TEST (FECAL DNA COLORECTAL CANCER SCREENING)     lose weight, follow low fat diet, follow low salt diet, routine labs ordered, call if any problems  There are no Patient Instructions on file for this visit. Follow-up and Dispositions    · Return in about 6 months (around 12/27/2022).

## 2022-06-30 LAB
ALBUMIN SERPL-MCNC: 4.3 G/DL (ref 3.8–4.8)
ALBUMIN/GLOB SERPL: 1.6 {RATIO} (ref 1.2–2.2)
ALP SERPL-CCNC: 92 IU/L (ref 44–121)
ALT SERPL-CCNC: 16 IU/L (ref 0–32)
AST SERPL-CCNC: 18 IU/L (ref 0–40)
BASOPHILS # BLD AUTO: 0 X10E3/UL (ref 0–0.2)
BASOPHILS NFR BLD AUTO: 0 %
BILIRUB SERPL-MCNC: 0.2 MG/DL (ref 0–1.2)
BUN SERPL-MCNC: 10 MG/DL (ref 6–24)
BUN/CREAT SERPL: 16 (ref 9–23)
CALCIUM SERPL-MCNC: 9.2 MG/DL (ref 8.7–10.2)
CHLORIDE SERPL-SCNC: 102 MMOL/L (ref 96–106)
CHOLEST SERPL-MCNC: 215 MG/DL (ref 100–199)
CO2 SERPL-SCNC: 23 MMOL/L (ref 20–29)
CREAT SERPL-MCNC: 0.64 MG/DL (ref 0.57–1)
EGFR: 108 ML/MIN/1.73
EOSINOPHIL # BLD AUTO: 0.1 X10E3/UL (ref 0–0.4)
EOSINOPHIL NFR BLD AUTO: 2 %
ERYTHROCYTE [DISTWIDTH] IN BLOOD BY AUTOMATED COUNT: 11.4 % (ref 11.7–15.4)
GLOBULIN SER CALC-MCNC: 2.7 G/DL (ref 1.5–4.5)
GLUCOSE SERPL-MCNC: 83 MG/DL (ref 65–99)
HCT VFR BLD AUTO: 37.7 % (ref 34–46.6)
HDLC SERPL-MCNC: 64 MG/DL
HGB BLD-MCNC: 12.4 G/DL (ref 11.1–15.9)
IMM GRANULOCYTES # BLD AUTO: 0 X10E3/UL (ref 0–0.1)
IMM GRANULOCYTES NFR BLD AUTO: 0 %
LDLC SERPL CALC-MCNC: 133 MG/DL (ref 0–99)
LYMPHOCYTES # BLD AUTO: 1.7 X10E3/UL (ref 0.7–3.1)
LYMPHOCYTES NFR BLD AUTO: 39 %
MCH RBC QN AUTO: 31 PG (ref 26.6–33)
MCHC RBC AUTO-ENTMCNC: 32.9 G/DL (ref 31.5–35.7)
MCV RBC AUTO: 94 FL (ref 79–97)
MONOCYTES # BLD AUTO: 0.6 X10E3/UL (ref 0.1–0.9)
MONOCYTES NFR BLD AUTO: 13 %
NEUTROPHILS # BLD AUTO: 2 X10E3/UL (ref 1.4–7)
NEUTROPHILS NFR BLD AUTO: 46 %
PLATELET # BLD AUTO: 322 X10E3/UL (ref 150–450)
POTASSIUM SERPL-SCNC: 4.3 MMOL/L (ref 3.5–5.2)
PROT SERPL-MCNC: 7 G/DL (ref 6–8.5)
RBC # BLD AUTO: 4 X10E6/UL (ref 3.77–5.28)
SODIUM SERPL-SCNC: 139 MMOL/L (ref 134–144)
TRIGL SERPL-MCNC: 103 MG/DL (ref 0–149)
TSH SERPL DL<=0.005 MIU/L-ACNC: 1.3 UIU/ML (ref 0.45–4.5)
VLDLC SERPL CALC-MCNC: 18 MG/DL (ref 5–40)
WBC # BLD AUTO: 4.3 X10E3/UL (ref 3.4–10.8)

## 2022-12-27 ENCOUNTER — OFFICE VISIT (OUTPATIENT)
Dept: INTERNAL MEDICINE CLINIC | Age: 52
End: 2022-12-27
Payer: COMMERCIAL

## 2022-12-27 VITALS
WEIGHT: 230 LBS | HEIGHT: 62 IN | BODY MASS INDEX: 42.33 KG/M2 | DIASTOLIC BLOOD PRESSURE: 87 MMHG | RESPIRATION RATE: 17 BRPM | HEART RATE: 81 BPM | OXYGEN SATURATION: 98 % | SYSTOLIC BLOOD PRESSURE: 126 MMHG

## 2022-12-27 DIAGNOSIS — Z01.419 ENCOUNTER FOR GYNECOLOGICAL EXAMINATION: ICD-10-CM

## 2022-12-27 DIAGNOSIS — I10 ESSENTIAL HYPERTENSION: Primary | ICD-10-CM

## 2022-12-27 RX ORDER — NIFEDIPINE 60 MG/1
60 TABLET, EXTENDED RELEASE ORAL DAILY
Qty: 90 TABLET | Refills: 3 | Status: SHIPPED | OUTPATIENT
Start: 2022-12-27 | End: 2023-12-22

## 2022-12-27 NOTE — PROGRESS NOTES
Chani Zayas is a 50 y.o. female and presents with Follow-up and Hypertension    Willy Guallpa is doing well fels well, HTN Is well controlled, but gained 15 lbs since last visit. She says she has not been following diet recommendations or exercising. Denies depression or anxiety. Review of Systems  Review of Systems   Constitutional:  Negative for chills, fatigue, fever and unexpected weight change. HENT:  Negative for congestion, ear pain, sneezing and sore throat. Eyes:  Negative for pain and discharge. Respiratory:  Negative for cough, shortness of breath and wheezing. Cardiovascular:  Negative for chest pain, palpitations and leg swelling. Gastrointestinal:  Negative for abdominal pain, blood in stool, constipation and diarrhea. Endocrine: Negative for polydipsia and polyuria. Genitourinary:  Negative for difficulty urinating, dysuria, frequency, hematuria and urgency. Musculoskeletal:  Negative for arthralgias, back pain and joint swelling. Skin:  Negative for rash. Allergic/Immunologic: Negative for environmental allergies and food allergies. Neurological:  Negative for dizziness, speech difficulty, weakness, light-headedness, numbness and headaches. Hematological:  Negative for adenopathy. Psychiatric/Behavioral:  Negative for behavioral problems (Depression), sleep disturbance and suicidal ideas.          Past Medical History:   Diagnosis Date    Hypertension      Past Surgical History:   Procedure Laterality Date    HX CHOLECYSTECTOMY      HX HYSTERECTOMY       Social History     Socioeconomic History    Marital status: SINGLE   Tobacco Use    Smoking status: Never    Smokeless tobacco: Never   Vaping Use    Vaping Use: Never used   Substance and Sexual Activity    Alcohol use: Yes     Comment: occasional    Drug use: Never     Social Determinants of Health     Financial Resource Strain: Low Risk     Difficulty of Paying Living Expenses: Not hard at all   Food Insecurity: No Food Insecurity    Worried About Running Out of Food in the Last Year: Never true    Ran Out of Food in the Last Year: Never true     Family History   Problem Relation Age of Onset    Hypertension Mother     Diabetes Father     Breast Cancer Paternal Aunt      Current Outpatient Medications   Medication Sig Dispense Refill    NIFEdipine ER (ADALAT CC) 60 mg ER tablet Take 1 Tablet by mouth daily for 360 days. 90 Tablet 3    loratadine (CLARITIN) 10 mg tablet Take 10 mg by mouth daily. No Known Allergies    Objective:  Visit Vitals  /87 (BP 1 Location: Right upper arm, BP Patient Position: Sitting, BP Cuff Size: Adult)   Pulse 81   Resp 17   Ht 5' 2\" (1.575 m)   Wt 230 lb (104.3 kg)   SpO2 98%   BMI 42.07 kg/m²     Physical Exam:   Physical Exam  Constitutional:       General: She is not in acute distress. Appearance: Normal appearance. HENT:      Head: Normocephalic and atraumatic. Mouth/Throat:      Mouth: Mucous membranes are moist.   Eyes:      Extraocular Movements: Extraocular movements intact. Conjunctiva/sclera: Conjunctivae normal.      Pupils: Pupils are equal, round, and reactive to light. Cardiovascular:      Rate and Rhythm: Normal rate and regular rhythm. Pulses: Normal pulses. Heart sounds: Normal heart sounds. Pulmonary:      Effort: Pulmonary effort is normal.      Breath sounds: Normal breath sounds. Abdominal:      General: Abdomen is flat. Bowel sounds are normal. There is no distension. Palpations: Abdomen is soft. There is no mass. Tenderness: There is no abdominal tenderness. Musculoskeletal:         General: No swelling or deformity. Cervical back: Normal range of motion and neck supple. Right lower leg: No edema. Left lower leg: No edema. Lymphadenopathy:      Cervical: No cervical adenopathy. Skin:     General: Skin is warm and dry. Capillary Refill: Capillary refill takes less than 2 seconds.       Coloration: Skin is not jaundiced or pale. Findings: No erythema or rash. Neurological:      General: No focal deficit present. Mental Status: She is alert and oriented to person, place, and time. Psychiatric:         Mood and Affect: Mood normal.         Behavior: Behavior normal.         Thought Content: Thought content normal.         Judgment: Judgment normal.        Results for orders placed or performed in visit on 06/27/22   CBC WITH AUTOMATED DIFF   Result Value Ref Range    WBC 4.3 3.4 - 10.8 x10E3/uL    RBC 4.00 3.77 - 5.28 x10E6/uL    HGB 12.4 11.1 - 15.9 g/dL    HCT 37.7 34.0 - 46.6 %    MCV 94 79 - 97 fL    MCH 31.0 26.6 - 33.0 pg    MCHC 32.9 31.5 - 35.7 g/dL    RDW 11.4 (L) 11.7 - 15.4 %    PLATELET 739 032 - 209 x10E3/uL    NEUTROPHILS 46 Not Estab. %    Lymphocytes 39 Not Estab. %    MONOCYTES 13 Not Estab. %    EOSINOPHILS 2 Not Estab. %    BASOPHILS 0 Not Estab. %    ABS. NEUTROPHILS 2.0 1.4 - 7.0 x10E3/uL    Abs Lymphocytes 1.7 0.7 - 3.1 x10E3/uL    ABS. MONOCYTES 0.6 0.1 - 0.9 x10E3/uL    ABS. EOSINOPHILS 0.1 0.0 - 0.4 x10E3/uL    ABS. BASOPHILS 0.0 0.0 - 0.2 x10E3/uL    IMMATURE GRANULOCYTES 0 Not Estab. %    ABS. IMM. GRANS. 0.0 0.0 - 0.1 [id number]   METABOLIC PANEL, COMPREHENSIVE   Result Value Ref Range    Glucose 83 65 - 99 mg/dL    BUN 10 6 - 24 mg/dL    Creatinine 0.64 0.57 - 1.00 mg/dL    eGFR 108 >59 mL/min/1.73    BUN/Creatinine ratio 16 9 - 23    Sodium 139 134 - 144 mmol/L    Potassium 4.3 3.5 - 5.2 mmol/L    Chloride 102 96 - 106 mmol/L    CO2 23 20 - 29 mmol/L    Calcium 9.2 8.7 - 10.2 mg/dL    Protein, total 7.0 6.0 - 8.5 g/dL    Albumin 4.3 3.8 - 4.8 g/dL    GLOBULIN, TOTAL 2.7 1.5 - 4.5 g/dL    A-G Ratio 1.6 1.2 - 2.2    Bilirubin, total 0.2 0.0 - 1.2 mg/dL    Alk.  phosphatase 92 44 - 121 IU/L    AST (SGOT) 18 0 - 40 IU/L    ALT (SGPT) 16 0 - 32 IU/L   TSH 3RD GENERATION   Result Value Ref Range    TSH 1.300 0.450 - 4.500 uIU/mL   LIPID PANEL   Result Value Ref Range Cholesterol, total 215 (H) 100 - 199 mg/dL    Triglyceride 103 0 - 149 mg/dL    HDL Cholesterol 64 >39 mg/dL    VLDL, calculated 18 5 - 40 mg/dL    LDL, calculated 133 (H) 0 - 99 mg/dL       Assessment/Plan:    ICD-10-CM ICD-9-CM    1. Essential hypertension  S24 459.5 METABOLIC PANEL, COMPREHENSIVE      NIFEdipine ER (ADALAT CC) 60 mg ER tablet      2. Encounter for gynecological examination  Z01.419 V72.31 REFERRAL TO GYNECOLOGY        Orders Placed This Encounter    METABOLIC PANEL, COMPREHENSIVE     Scheduling Instructions: To do 3 days before next visit    REFERRAL TO GYNECOLOGY     Referral Priority:   Routine     Referral Type:   Consultation     Referral Reason:   Specialty Services Required     Referred to Provider:   Brien Castellano MD     Number of Visits Requested:   1    NIFEdipine ER (ADALAT CC) 60 mg ER tablet     Sig: Take 1 Tablet by mouth daily for 360 days. Dispense:  90 Tablet     Refill:  3     lose weight, increase physical activity, bring BP log to office visit, follow low fat diet, follow low salt diet, call if any problems  There are no Patient Instructions on file for this visit. Follow-up and Dispositions    Return for hypertension, obesity.

## 2022-12-27 NOTE — PROGRESS NOTES
1. \"Have you been to the ER, urgent care clinic since your last visit? Hospitalized since your last visit? \" No    2. \"Have you seen or consulted any other health care providers outside of the [address] [place] [address] since your last visit? \" No     3. For patients aged 39-70: Has the patient had a colonoscopy / FIT/ Cologuard? Yes - no Care Gap present      If the patient is female:    4. For patients aged 41-77: Has the patient had a mammogram within the past 2 years? Yes - no Care Gap present      5. For patients aged 21-65: Has the patient had a pap smear?  Yes - no Care Gap present      Chief Complaint   Patient presents with    Follow-up    Hypertension        Visit Vitals  /87 (BP 1 Location: Right upper arm, BP Patient Position: Sitting, BP Cuff Size: Adult)   Pulse 81   Resp 17   Ht 5' 2\" (1.575 m)   Wt 230 lb (104.3 kg)   SpO2 98%   BMI 42.07 kg/m²

## 2023-02-02 ENCOUNTER — OFFICE VISIT (OUTPATIENT)
Dept: OBGYN CLINIC | Age: 53
End: 2023-02-02
Payer: COMMERCIAL

## 2023-02-02 VITALS — WEIGHT: 237.56 LBS | BODY MASS INDEX: 43.72 KG/M2 | HEIGHT: 62 IN

## 2023-02-02 DIAGNOSIS — Z01.419 ROUTINE GYNECOLOGICAL EXAMINATION: ICD-10-CM

## 2023-02-02 DIAGNOSIS — Z12.31 SCREENING MAMMOGRAM FOR BREAST CANCER: ICD-10-CM

## 2023-02-02 DIAGNOSIS — Z12.72 ENCOUNTER FOR SCREENING FOR MALIGNANT NEOPLASM OF VAGINA: Primary | ICD-10-CM

## 2023-02-02 DIAGNOSIS — N95.1 MENOPAUSAL SYNDROME: ICD-10-CM

## 2023-02-02 PROCEDURE — 99396 PREV VISIT EST AGE 40-64: CPT | Performed by: OBSTETRICS & GYNECOLOGY

## 2023-02-02 NOTE — PROGRESS NOTES
Judith Go is a , 50 y.o. female   No LMP recorded. Patient has had a hysterectomy. She presents for her annual    She is having no significant problems. Menstrual status:  Cycles are hysterectomy. Flow: absent. She does not have dysmenorrhea. Medical conditions:  Since her last annual GYN exam about one year ago, she has not the following changes in her health history: none. Mammogram History:    [facility] Results (most recent):  Results from [place] encounter on 22    [facility] 3D AMIRA W MAMMO BI SCREENING INCL CAD    Narrative  BILATERAL SCREENING DIGITAL TOMOSYNTHESIS MAMMOGRAM:    COMPARISON: Comparison is made to the previous digital studies dated , ,   and     HISTORY: No family history    NCI lifetime breast cancer risk 8.66%. [place] 5 year breast cancer risk1.14%. TECHNIQUE:  Computer Aided Detection (CAD) was used in evaluating this  mammogram.    FINDINGS: Bilateral 3D tomosynthesis with C-View MLO and CC imaging performed. The breast parenchyma has scattered fibroglandular densities. No suspicious  calcifications, suspicious masses, architectural distortion, skin thickening, or  evident axillary lymphadenopathy. Impression  No mammographic evidence of malignancy. RESULT CODE:  ACR BI-RADS Category 2, benign. FOLLOWUP CODE: 1, 1 year (annual) mammogram followup    According to the [address], yearly mammograms are recommended  starting at age 36 and continuing, as long as, a woman is in good health. Clinical breast exams should be part of a periodic health exam--about every 3  years for women in their 19's and 29's and every year for women 40 and over. Breast self-exam is an option for women starting in their 20's. Any breast  change noted on a breast self-exam should be reported promptly to the patient's  healthcare provider.   Breast MRI is recommended for women with an approximately  20-25% or greater lifetime risk of breast cancer, including women with a strong  family history of breast or ovarian cancer and women who have been treated for  Hodgkin's disease. A negative Mammography report should not discourage follow up or biopsy of a  clinically significant finding and/or abnormality. Dense breast tissue may obscure small neoplasms. DEXA Results (most recent):  No results found for this or any previous visit. Past Medical History:   Diagnosis Date    Hypertension      Past Surgical History:   Procedure Laterality Date    HX CHOLECYSTECTOMY      HX HYSTERECTOMY         Prior to Admission medications    Medication Sig Start Date End Date Taking? Authorizing Provider   NIFEdipine ER (ADALAT CC) 60 mg ER tablet Take 1 Tablet by mouth daily for 360 days. 12/27/22 12/22/23 Yes Akua Amato MD   loratadine (CLARITIN) 10 mg tablet Take 10 mg by mouth daily. Yes Provider, Historical       No Known Allergies       Tobacco History:  reports that she has never smoked. She has never used smokeless tobacco.  Alcohol use:  reports current alcohol use. Drug use:  reports no history of drug use. Family Medical/Cancer History:   Family History   Problem Relation Age of Onset    Hypertension Mother     Diabetes Father     Breast Cancer Paternal Aunt           Review of Systems   Constitutional:  Negative for chills, fever, malaise/fatigue and weight loss. HENT:  Negative for congestion, ear pain, sinus pain and tinnitus. Eyes:  Negative for blurred vision and double vision. Respiratory:  Negative for cough, shortness of breath and wheezing. Cardiovascular:  Negative for chest pain and palpitations. Gastrointestinal:  Negative for abdominal pain, blood in stool, constipation, diarrhea, heartburn, nausea and vomiting. Genitourinary:  Negative for dysuria, flank pain, frequency, hematuria and urgency. Musculoskeletal:  Negative for joint pain and myalgias.    Skin:  Negative for itching and rash. Neurological:  Negative for dizziness, weakness and headaches. Psychiatric/Behavioral:  Negative for depression, memory loss and suicidal ideas. The patient is not nervous/anxious and does not have insomnia. Physical Exam  Constitutional:       Appearance: Normal appearance. HENT:      Head: Normocephalic and atraumatic. Cardiovascular:      Rate and Rhythm: Normal rate. Heart sounds: Normal heart sounds. Pulmonary:      Effort: Pulmonary effort is normal.      Breath sounds: Normal breath sounds. Chest:   Breasts:     Right: Normal.      Left: Normal.   Abdominal:      General: Abdomen is flat. Palpations: Abdomen is soft. Genitourinary:     General: Normal vulva. Vagina: Normal.      Uterus: Absent. Adnexa: Right adnexa normal and left adnexa normal.      Rectum: Normal.      Comments: PAP Obtained  Neurological:      Mental Status: She is alert. Psychiatric:         Mood and Affect: Mood normal.         Behavior: Behavior normal.         Thought Content: Thought content normal.        Visit Vitals  Ht 5' 2\" (1.575 m)   Wt 237 lb 9 oz (107.8 kg)   BMI 43.45 kg/m²         Assessment: Diagnoses and all orders for this visit:    1. Encounter for screening for malignant neoplasm of vagina  -     PAP IG, RFX APTIMA HPV ASCUS ([id number])    2. Routine gynecological examination  -     PAP IG, RFX APTIMA HPV ASCUS ([id number])    3. BMI 40.0-44.9, adult ([address]    4. Menopausal syndrome    5. Screening mammogram for breast cancer  -     [facility] 3D AMIRA W MAMMO BI SCREENING INCL CAD; Future  Did Cologuard    Plan: Questions addressed  Counseled re: diet, exercise, healthy lifestyle  Return for Annual  Rec annual mammogram        Follow-up and Dispositions    Return for Mammogram, 1 yr annual, 1 yr mammo.

## 2023-02-02 NOTE — PROGRESS NOTES
Chief Complaint   Patient presents with    Annual Exam     [id number]     Visit Vitals  Ht 5' 2\" (1.575 m)   Wt 237 lb 9 oz (107.8 kg)   BMI 43.45 kg/m²

## 2023-03-08 ENCOUNTER — TRANSCRIBE ORDER (OUTPATIENT)
Dept: SCHEDULING | Age: 53
End: 2023-03-08

## 2023-03-08 DIAGNOSIS — Z12.31 SCREENING MAMMOGRAM FOR HIGH-RISK PATIENT: Primary | ICD-10-CM

## 2023-04-12 ENCOUNTER — HOSPITAL ENCOUNTER (OUTPATIENT)
Dept: MAMMOGRAPHY | Age: 53
Discharge: HOME | End: 2023-04-12
Attending: OBSTETRICS & GYNECOLOGY
Payer: COMMERCIAL

## 2023-04-12 DIAGNOSIS — Z12.31 SCREENING MAMMOGRAM FOR HIGH-RISK PATIENT: ICD-10-CM

## 2023-04-12 PROCEDURE — 77063 BREAST TOMOSYNTHESIS BI: CPT

## 2023-04-22 DIAGNOSIS — Z12.31 SCREENING MAMMOGRAM FOR HIGH-RISK PATIENT: Primary | ICD-10-CM

## 2023-05-18 RX ORDER — LORATADINE 10 MG/1
10 TABLET ORAL DAILY
COMMUNITY

## 2023-05-18 RX ORDER — NIFEDIPINE 60 MG/1
60 TABLET, FILM COATED, EXTENDED RELEASE ORAL DAILY
Qty: 30 TABLET | Refills: 11 | COMMUNITY
Start: 2022-12-27 | End: 2023-12-22

## 2023-10-11 ENCOUNTER — OFFICE VISIT (OUTPATIENT)
Facility: CLINIC | Age: 53
End: 2023-10-11
Payer: COMMERCIAL

## 2023-10-11 VITALS
HEIGHT: 62 IN | OXYGEN SATURATION: 99 % | HEART RATE: 81 BPM | SYSTOLIC BLOOD PRESSURE: 112 MMHG | WEIGHT: 240 LBS | BODY MASS INDEX: 44.16 KG/M2 | TEMPERATURE: 98.6 F | DIASTOLIC BLOOD PRESSURE: 77 MMHG

## 2023-10-11 DIAGNOSIS — E66.01 MORBID (SEVERE) OBESITY DUE TO EXCESS CALORIES (HCC): ICD-10-CM

## 2023-10-11 DIAGNOSIS — I10 ESSENTIAL (PRIMARY) HYPERTENSION: Primary | ICD-10-CM

## 2023-10-11 PROCEDURE — 3078F DIAST BP <80 MM HG: CPT | Performed by: INTERNAL MEDICINE

## 2023-10-11 PROCEDURE — 99213 OFFICE O/P EST LOW 20 MIN: CPT | Performed by: INTERNAL MEDICINE

## 2023-10-11 PROCEDURE — 3074F SYST BP LT 130 MM HG: CPT | Performed by: INTERNAL MEDICINE

## 2023-10-11 RX ORDER — NIFEDIPINE 60 MG/1
60 TABLET, FILM COATED, EXTENDED RELEASE ORAL DAILY
Qty: 90 TABLET | Refills: 3 | Status: SHIPPED | OUTPATIENT
Start: 2023-10-11

## 2023-10-11 SDOH — ECONOMIC STABILITY: FOOD INSECURITY: WITHIN THE PAST 12 MONTHS, THE FOOD YOU BOUGHT JUST DIDN'T LAST AND YOU DIDN'T HAVE MONEY TO GET MORE.: NEVER TRUE

## 2023-10-11 SDOH — ECONOMIC STABILITY: INCOME INSECURITY: HOW HARD IS IT FOR YOU TO PAY FOR THE VERY BASICS LIKE FOOD, HOUSING, MEDICAL CARE, AND HEATING?: NOT HARD AT ALL

## 2023-10-11 SDOH — ECONOMIC STABILITY: FOOD INSECURITY: WITHIN THE PAST 12 MONTHS, YOU WORRIED THAT YOUR FOOD WOULD RUN OUT BEFORE YOU GOT MONEY TO BUY MORE.: NEVER TRUE

## 2023-10-11 SDOH — ECONOMIC STABILITY: HOUSING INSECURITY
IN THE LAST 12 MONTHS, WAS THERE A TIME WHEN YOU DID NOT HAVE A STEADY PLACE TO SLEEP OR SLEPT IN A SHELTER (INCLUDING NOW)?: NO

## 2023-10-11 ASSESSMENT — ENCOUNTER SYMPTOMS
RESPIRATORY NEGATIVE: 1
GASTROINTESTINAL NEGATIVE: 1

## 2023-10-11 ASSESSMENT — PATIENT HEALTH QUESTIONNAIRE - PHQ9
SUM OF ALL RESPONSES TO PHQ9 QUESTIONS 1 & 2: 0
1. LITTLE INTEREST OR PLEASURE IN DOING THINGS: 0
2. FEELING DOWN, DEPRESSED OR HOPELESS: 0
SUM OF ALL RESPONSES TO PHQ QUESTIONS 1-9: 0

## 2023-10-11 ASSESSMENT — ANXIETY QUESTIONNAIRES
1. FEELING NERVOUS, ANXIOUS, OR ON EDGE: 0
4. TROUBLE RELAXING: 0
IF YOU CHECKED OFF ANY PROBLEMS ON THIS QUESTIONNAIRE, HOW DIFFICULT HAVE THESE PROBLEMS MADE IT FOR YOU TO DO YOUR WORK, TAKE CARE OF THINGS AT HOME, OR GET ALONG WITH OTHER PEOPLE: NOT DIFFICULT AT ALL
5. BEING SO RESTLESS THAT IT IS HARD TO SIT STILL: 0
GAD7 TOTAL SCORE: 0
6. BECOMING EASILY ANNOYED OR IRRITABLE: 0
2. NOT BEING ABLE TO STOP OR CONTROL WORRYING: 0
7. FEELING AFRAID AS IF SOMETHING AWFUL MIGHT HAPPEN: 0
3. WORRYING TOO MUCH ABOUT DIFFERENT THINGS: 0

## 2023-10-11 NOTE — PROGRESS NOTES
Luis Miguel Blanco is a 48 y.o. female and presents with Follow-up Chronic Condition and Hypertension (Checks BP at home)    Yessy Calero has HTN, well controlled on nifedipine. She gained about 6 lbs since last visit. Last labs in April this year, all wnl. She denies chest pain, shortness of breath, leg swelling. No recent events, no ER visits or hospital visits since last time I saw her. She says she's not doing to great with her diet, she says that she likes to try different things, especially in the summertime she tends to eat more than usual.  She does not feel she struggles with appetite, she just likes to try things that are tasty. She is walking every day. She does not want flu shot. Review of Systems  Review of Systems   Constitutional: Negative. HENT: Negative. Respiratory: Negative. Cardiovascular: Negative. Gastrointestinal: Negative. Endocrine: Negative. Genitourinary: Negative. Musculoskeletal: Negative. Skin: Negative. Neurological: Negative. Psychiatric/Behavioral: Negative. All other systems reviewed and are negative. Past Medical History:   Diagnosis Date    Arthritis 3 years ago    Hypertension      Past Surgical History:   Procedure Laterality Date    CHOLECYSTECTOMY      HYSTERECTOMY (CERVIX STATUS UNKNOWN)       Social History     Socioeconomic History    Marital status: Single     Spouse name: None    Number of children: None    Years of education: None    Highest education level: None   Tobacco Use    Smoking status: Never    Smokeless tobacco: Never   Vaping Use    Vaping Use: Never used   Substance and Sexual Activity    Alcohol use:  Yes     Alcohol/week: 1.0 standard drink of alcohol    Drug use: Never    Sexual activity: Yes     Partners: Male     Birth control/protection: None     Comment: [facility]     Social Determinants of Health     Financial Resource Strain: Low Risk  (10/11/2023)    Overall Financial Resource Strain (CARDIA)     Difficulty

## 2024-03-25 ENCOUNTER — TELEPHONE (OUTPATIENT)
Facility: CLINIC | Age: 54
End: 2024-03-25

## 2024-04-01 ENCOUNTER — TELEPHONE (OUTPATIENT)
Facility: CLINIC | Age: 54
End: 2024-04-01

## 2024-04-01 NOTE — TELEPHONE ENCOUNTER
I received forms for leave of absence, have never seen this patient. Cannot fill the forms right now.  She has an appointment coming up on April 12 I can discuss at that time what it is for

## 2024-04-09 ENCOUNTER — TRANSCRIBE ORDERS (OUTPATIENT)
Facility: HOSPITAL | Age: 54
End: 2024-04-09

## 2024-04-09 DIAGNOSIS — Z12.31 VISIT FOR SCREENING MAMMOGRAM: Primary | ICD-10-CM

## 2024-04-10 LAB
ALBUMIN SERPL-MCNC: 4.1 G/DL (ref 3.8–4.9)
ALBUMIN/GLOB SERPL: 1.4 {RATIO} (ref 1.2–2.2)
ALP SERPL-CCNC: 111 IU/L (ref 44–121)
ALT SERPL-CCNC: 55 IU/L (ref 0–32)
AST SERPL-CCNC: 35 IU/L (ref 0–40)
BASOPHILS # BLD AUTO: 0 X10E3/UL (ref 0–0.2)
BASOPHILS NFR BLD AUTO: 0 %
BILIRUB SERPL-MCNC: 0.5 MG/DL (ref 0–1.2)
BUN SERPL-MCNC: 11 MG/DL (ref 6–24)
BUN/CREAT SERPL: 20 (ref 9–23)
CALCIUM SERPL-MCNC: 9.1 MG/DL (ref 8.7–10.2)
CHLORIDE SERPL-SCNC: 103 MMOL/L (ref 96–106)
CHOLEST SERPL-MCNC: 230 MG/DL (ref 100–199)
CO2 SERPL-SCNC: 24 MMOL/L (ref 20–29)
CREAT SERPL-MCNC: 0.54 MG/DL (ref 0.57–1)
EGFRCR SERPLBLD CKD-EPI 2021: 111 ML/MIN/1.73
EOSINOPHIL # BLD AUTO: 0 X10E3/UL (ref 0–0.4)
EOSINOPHIL NFR BLD AUTO: 1 %
ERYTHROCYTE [DISTWIDTH] IN BLOOD BY AUTOMATED COUNT: 11.4 % (ref 11.7–15.4)
GLOBULIN SER CALC-MCNC: 3 G/DL (ref 1.5–4.5)
GLUCOSE SERPL-MCNC: 92 MG/DL (ref 70–99)
HCT VFR BLD AUTO: 39.9 % (ref 34–46.6)
HDLC SERPL-MCNC: 78 MG/DL
HGB BLD-MCNC: 12.7 G/DL (ref 11.1–15.9)
IMM GRANULOCYTES # BLD AUTO: 0 X10E3/UL (ref 0–0.1)
IMM GRANULOCYTES NFR BLD AUTO: 0 %
LDLC SERPL CALC-MCNC: 141 MG/DL (ref 0–99)
LYMPHOCYTES # BLD AUTO: 1.8 X10E3/UL (ref 0.7–3.1)
LYMPHOCYTES NFR BLD AUTO: 45 %
MCH RBC QN AUTO: 30.6 PG (ref 26.6–33)
MCHC RBC AUTO-ENTMCNC: 31.8 G/DL (ref 31.5–35.7)
MCV RBC AUTO: 96 FL (ref 79–97)
MONOCYTES # BLD AUTO: 0.3 X10E3/UL (ref 0.1–0.9)
MONOCYTES NFR BLD AUTO: 8 %
NEUTROPHILS # BLD AUTO: 1.9 X10E3/UL (ref 1.4–7)
NEUTROPHILS NFR BLD AUTO: 46 %
PLATELET # BLD AUTO: 269 X10E3/UL (ref 150–450)
POTASSIUM SERPL-SCNC: 4.2 MMOL/L (ref 3.5–5.2)
PROT SERPL-MCNC: 7.1 G/DL (ref 6–8.5)
RBC # BLD AUTO: 4.15 X10E6/UL (ref 3.77–5.28)
SODIUM SERPL-SCNC: 142 MMOL/L (ref 134–144)
TRIGL SERPL-MCNC: 67 MG/DL (ref 0–149)
TSH SERPL DL<=0.005 MIU/L-ACNC: 0.79 UIU/ML (ref 0.45–4.5)
VLDLC SERPL CALC-MCNC: 11 MG/DL (ref 5–40)
WBC # BLD AUTO: 4.1 X10E3/UL (ref 3.4–10.8)

## 2024-04-11 DIAGNOSIS — I10 ESSENTIAL (PRIMARY) HYPERTENSION: ICD-10-CM

## 2024-04-11 NOTE — TELEPHONE ENCOUNTER
Called pt to let her know that the FMLA form needs to be discussed with the provider. She has an appointment on 04/12/2024 with Dr. Lance.

## 2024-04-12 ENCOUNTER — OFFICE VISIT (OUTPATIENT)
Facility: CLINIC | Age: 54
End: 2024-04-12
Payer: COMMERCIAL

## 2024-04-12 VITALS
RESPIRATION RATE: 18 BRPM | HEART RATE: 91 BPM | TEMPERATURE: 98.6 F | WEIGHT: 239 LBS | DIASTOLIC BLOOD PRESSURE: 82 MMHG | BODY MASS INDEX: 43.98 KG/M2 | SYSTOLIC BLOOD PRESSURE: 128 MMHG | HEIGHT: 62 IN | OXYGEN SATURATION: 98 %

## 2024-04-12 DIAGNOSIS — E78.00 ELEVATED LDL CHOLESTEROL LEVEL: ICD-10-CM

## 2024-04-12 DIAGNOSIS — R74.01 ELEVATED ALT MEASUREMENT: ICD-10-CM

## 2024-04-12 DIAGNOSIS — J30.9 ALLERGIC RHINITIS, UNSPECIFIED SEASONALITY, UNSPECIFIED TRIGGER: ICD-10-CM

## 2024-04-12 DIAGNOSIS — I10 ESSENTIAL HYPERTENSION: Primary | ICD-10-CM

## 2024-04-12 PROCEDURE — 3079F DIAST BP 80-89 MM HG: CPT | Performed by: STUDENT IN AN ORGANIZED HEALTH CARE EDUCATION/TRAINING PROGRAM

## 2024-04-12 PROCEDURE — 3074F SYST BP LT 130 MM HG: CPT | Performed by: STUDENT IN AN ORGANIZED HEALTH CARE EDUCATION/TRAINING PROGRAM

## 2024-04-12 PROCEDURE — 99214 OFFICE O/P EST MOD 30 MIN: CPT | Performed by: STUDENT IN AN ORGANIZED HEALTH CARE EDUCATION/TRAINING PROGRAM

## 2024-04-12 ASSESSMENT — PATIENT HEALTH QUESTIONNAIRE - PHQ9
4. FEELING TIRED OR HAVING LITTLE ENERGY: NOT AT ALL
SUM OF ALL RESPONSES TO PHQ QUESTIONS 1-9: 0
SUM OF ALL RESPONSES TO PHQ QUESTIONS 1-9: 0
9. THOUGHTS THAT YOU WOULD BE BETTER OFF DEAD, OR OF HURTING YOURSELF: NOT AT ALL
8. MOVING OR SPEAKING SO SLOWLY THAT OTHER PEOPLE COULD HAVE NOTICED. OR THE OPPOSITE, BEING SO FIGETY OR RESTLESS THAT YOU HAVE BEEN MOVING AROUND A LOT MORE THAN USUAL: NOT AT ALL
2. FEELING DOWN, DEPRESSED OR HOPELESS: NOT AT ALL
3. TROUBLE FALLING OR STAYING ASLEEP: NOT AT ALL
SUM OF ALL RESPONSES TO PHQ9 QUESTIONS 1 & 2: 0
6. FEELING BAD ABOUT YOURSELF - OR THAT YOU ARE A FAILURE OR HAVE LET YOURSELF OR YOUR FAMILY DOWN: NOT AT ALL
SUM OF ALL RESPONSES TO PHQ QUESTIONS 1-9: 0
5. POOR APPETITE OR OVEREATING: NOT AT ALL
SUM OF ALL RESPONSES TO PHQ QUESTIONS 1-9: 0
10. IF YOU CHECKED OFF ANY PROBLEMS, HOW DIFFICULT HAVE THESE PROBLEMS MADE IT FOR YOU TO DO YOUR WORK, TAKE CARE OF THINGS AT HOME, OR GET ALONG WITH OTHER PEOPLE: NOT DIFFICULT AT ALL
7. TROUBLE CONCENTRATING ON THINGS, SUCH AS READING THE NEWSPAPER OR WATCHING TELEVISION: NOT AT ALL
1. LITTLE INTEREST OR PLEASURE IN DOING THINGS: NOT AT ALL

## 2024-04-12 ASSESSMENT — ENCOUNTER SYMPTOMS
SHORTNESS OF BREATH: 0
COUGH: 0

## 2024-04-12 NOTE — PROGRESS NOTES
Leny Sawant (: 1970) is a 54 y.o. female, Established patient patient, here for evaluation of the following chief complaint(s):  Established New Doctor       ASSESSMENT/PLAN:  Below is the assessment and plan developed based on review of pertinent history, physical exam, labs, studies, and medications.    1. Essential hypertension  2. Allergic rhinitis, unspecified seasonality, unspecified trigger  3. Elevated LDL cholesterol level  4. BMI 40.0-44.9, adult (HCC)  5. Elevated ALT measurement      The 10-year ASCVD risk score (Maci DIEGO, et al., 2019) is: 3%    Values used to calculate the score:      Age: 54 years      Sex: Female      Is Non- : Yes      Diabetic: No      Tobacco smoker: No      Systolic Blood Pressure: 128 mmHg      Is BP treated: Yes      HDL Cholesterol: 78 mg/dL      Total Cholesterol: 230 mg/dL      Established patient of practice, previously seeing Dr. Baker.  First visit with me.     Blood pressure is controlled, continue current medication.    Allergic rhinitis: Recommend to take Flonase/ antihistamine loratadine as needed    Discussed blood work with her, LDL was elevated, but ASCVD score is 3% for now recommend lifestyle changes.  ALT is also mildly elevated, recommended to avoid alcohol, again could be related to fatty liver.  Lifestyle changes as above.  Monitor.    She has an appointment with Dr. Melgoza and mammogram   Her FMLA form has been filled for intermittently to come to appointments.    Return in about 3 months (around 2024) for follow up.         SUBJECTIVE/OBJECTIVE:  HPI    Leny Sawant Is a 54-year-old presents to clinic for follow-up.  Established patient of practice, previously seen Dr. Baker, first with me.  She has hypertension, currently on nifedipine which is controlled.  She has an appointment coming up with Dr. Melgoza, and is also a mammogram scheduled.    She is doing well, she did say that she went to [place] for trip

## 2024-04-12 NOTE — PROGRESS NOTES
\"Have you been to the ER, urgent care clinic since your last visit?  Hospitalized since your last visit?\"    NO    “Have you seen or consulted any other health care providers outside of [facility] since your last visit?”    NO      Chief Complaint   Patient presents with    Established New Doctor     /82 (Site: Left Upper Arm, Position: Sitting, Cuff Size: Large Adult)   Pulse 91   Temp 98.6 °F (37 °C) (Oral)   Resp 18   Ht 1.575 m (5' 2\")   Wt 108.4 kg (239 lb)   LMP  (LMP Unknown)   SpO2 98%   BMI 43.71 kg/m²               Click Here for Release of Records Request

## 2024-04-18 ENCOUNTER — OFFICE VISIT (OUTPATIENT)
Age: 54
End: 2024-04-18
Payer: COMMERCIAL

## 2024-04-18 ENCOUNTER — HOSPITAL ENCOUNTER (OUTPATIENT)
Facility: HOSPITAL | Age: 54
Discharge: HOME | End: 2024-04-18
Attending: OBSTETRICS & GYNECOLOGY
Payer: COMMERCIAL

## 2024-04-18 VITALS
DIASTOLIC BLOOD PRESSURE: 72 MMHG | SYSTOLIC BLOOD PRESSURE: 130 MMHG | HEIGHT: 62 IN | WEIGHT: 234.5 LBS | BODY MASS INDEX: 43.15 KG/M2

## 2024-04-18 DIAGNOSIS — Z01.419 GYNECOLOGIC EXAM NORMAL: ICD-10-CM

## 2024-04-18 DIAGNOSIS — Z12.72 SCREENING FOR VAGINAL CANCER: Primary | ICD-10-CM

## 2024-04-18 DIAGNOSIS — N95.1 MENOPAUSAL SYNDROME: ICD-10-CM

## 2024-04-18 DIAGNOSIS — Z12.31 VISIT FOR SCREENING MAMMOGRAM: ICD-10-CM

## 2024-04-18 PROCEDURE — 3078F DIAST BP <80 MM HG: CPT | Performed by: OBSTETRICS & GYNECOLOGY

## 2024-04-18 PROCEDURE — 99396 PREV VISIT EST AGE 40-64: CPT | Performed by: OBSTETRICS & GYNECOLOGY

## 2024-04-18 PROCEDURE — 3075F SYST BP GE 130 - 139MM HG: CPT | Performed by: OBSTETRICS & GYNECOLOGY

## 2024-04-18 PROCEDURE — 77063 BREAST TOMOSYNTHESIS BI: CPT

## 2024-04-18 ASSESSMENT — ENCOUNTER SYMPTOMS
RESPIRATORY NEGATIVE: 1
GASTROINTESTINAL NEGATIVE: 1

## 2024-04-18 NOTE — PROGRESS NOTES
Leny Sawant is a , 54 y.o. female   No LMP recorded (lmp unknown). Patient has had a hysterectomy.    She presents for her annual    She is having no significant problems.      Menstrual status:  Cycles are hysterectomy.    Flow: absent.      She does not have dysmenorrhea.      Medical conditions:  Since her last annual GYN exam about one year ago, she has not the following changes in her health history: none.     Mammogram History:    NICOLE Results (most recent):  @[facility]([id number]:1)@     DEXA Results (most recent):  @[facility]([id number]:1)@       Past Medical History:   Diagnosis Date    Arthritis 3 years ago    Hypertension      Past Surgical History:   Procedure Laterality Date    CHOLECYSTECTOMY      HYSTERECTOMY (CERVIX STATUS UNKNOWN)         Prior to Admission medications    Medication Sig Start Date End Date Taking? Authorizing Provider   NIFEdipine (ADALAT CC) 60 MG extended release tablet Take 1 tablet by mouth daily 10/11/23  Yes Ruma Avalos MD   loratadine (CLARITIN) 10 MG tablet Take 1 tablet by mouth daily   Yes Automatic Reconciliation, Ar       No Known Allergies       Tobacco History:  reports that she has never smoked. She has never used smokeless tobacco.  Alcohol use:  reports current alcohol use of about 1.0 standard drink of alcohol per week.  Drug use:  reports no history of drug use.    Family Medical/Cancer History:   Family History   Problem Relation Age of Onset    Breast Cancer Paternal Aunt     Diabetes Father     Hypertension Mother         Review of Systems   Constitutional: Negative.    Respiratory: Negative.     Cardiovascular: Negative.    Gastrointestinal: Negative.    Genitourinary: Negative.    Musculoskeletal: Negative.    Neurological: Negative.    Psychiatric/Behavioral: Negative.           /72 (Site: Left Upper Arm, Position: Sitting, Cuff Size: Large Adult)   Ht 1.575 m (5' 2\")   Wt 106.4 kg (234 lb 8 oz)   LMP  (LMP Unknown)

## 2024-04-18 NOTE — PROGRESS NOTES
Chief Complaint   Patient presents with    Annual Exam     Mammo done today     /72 (Site: Left Upper Arm, Position: Sitting, Cuff Size: Large Adult)   Ht 1.575 m (5' 2\")   Wt 106.4 kg (234 lb 8 oz)   LMP  (LMP Unknown)   BMI 42.89 kg/m²

## 2024-04-24 LAB
., LABCORP: NORMAL
CYTOLOGIST CVX/VAG CYTO: NORMAL
CYTOLOGY CVX/VAG DOC CYTO: NORMAL
CYTOLOGY CVX/VAG DOC THIN PREP: NORMAL
DX ICD CODE: NORMAL
Lab: NORMAL
OTHER STN SPEC: NORMAL
STAT OF ADQ CVX/VAG CYTO-IMP: NORMAL

## 2024-08-09 ENCOUNTER — OFFICE VISIT (OUTPATIENT)
Facility: CLINIC | Age: 54
End: 2024-08-09
Payer: COMMERCIAL

## 2024-08-09 VITALS
BODY MASS INDEX: 43.39 KG/M2 | RESPIRATION RATE: 18 BRPM | OXYGEN SATURATION: 99 % | SYSTOLIC BLOOD PRESSURE: 125 MMHG | TEMPERATURE: 98 F | HEART RATE: 82 BPM | DIASTOLIC BLOOD PRESSURE: 82 MMHG | WEIGHT: 235.8 LBS | HEIGHT: 62 IN

## 2024-08-09 DIAGNOSIS — R74.01 ELEVATED ALT MEASUREMENT: ICD-10-CM

## 2024-08-09 DIAGNOSIS — I10 ESSENTIAL HYPERTENSION: Primary | ICD-10-CM

## 2024-08-09 PROCEDURE — 3074F SYST BP LT 130 MM HG: CPT | Performed by: STUDENT IN AN ORGANIZED HEALTH CARE EDUCATION/TRAINING PROGRAM

## 2024-08-09 PROCEDURE — 99214 OFFICE O/P EST MOD 30 MIN: CPT | Performed by: STUDENT IN AN ORGANIZED HEALTH CARE EDUCATION/TRAINING PROGRAM

## 2024-08-09 PROCEDURE — 3079F DIAST BP 80-89 MM HG: CPT | Performed by: STUDENT IN AN ORGANIZED HEALTH CARE EDUCATION/TRAINING PROGRAM

## 2024-08-09 ASSESSMENT — ENCOUNTER SYMPTOMS
COUGH: 0
SHORTNESS OF BREATH: 0
ABDOMINAL PAIN: 0

## 2024-08-09 NOTE — PROGRESS NOTES
Chief Complaint   Patient presents with    Follow-up   /82 (Site: Left Upper Arm, Position: Sitting)   Pulse 82   Temp 98 °F (36.7 °C) (Oral)   Resp 18   Ht 1.575 m (5' 2.01\")   Wt 107 kg (235 lb 12.8 oz)   LMP  (LMP Unknown)   SpO2 99%   BMI 43.12 kg/m²       \"Have you been to the ER, urgent care clinic since your last visit?  Hospitalized since your last visit?\"    NO    “Have you seen or consulted any other health care providers outside of [facility] since your last visit?”    NO            Click Here for Release of Records Request  
Negative for fever.   Respiratory:  Negative for cough and shortness of breath.    Cardiovascular:  Negative for chest pain and palpitations.   Gastrointestinal:  Negative for abdominal pain.   Neurological:  Negative for dizziness, seizures and syncope.   Psychiatric/Behavioral:  Negative for agitation and behavioral problems.            Vitals:    08/09/24 1344   BP: 125/82   Site: Left Upper Arm   Position: Sitting   Pulse: 82   Resp: 18   Temp: 98 °F (36.7 °C)   TempSrc: Oral   SpO2: 99%   Weight: 107 kg (235 lb 12.8 oz)   Height: 1.575 m (5' 2.01\")      Physical Exam  Constitutional:       Appearance: Normal appearance.   Cardiovascular:      Rate and Rhythm: Normal rate and regular rhythm.   Pulmonary:      Effort: Pulmonary effort is normal.      Breath sounds: Normal breath sounds.   Neurological:      Mental Status: She is alert. Mental status is at baseline.   Psychiatric:         Mood and Affect: Mood normal.         Behavior: Behavior normal.                 An electronic signature was used to authenticate this note.  -- Giovani Lance MD

## 2024-09-14 LAB
ALBUMIN SERPL-MCNC: 4.3 G/DL (ref 3.8–4.9)
ALP SERPL-CCNC: 97 IU/L (ref 44–121)
ALT SERPL-CCNC: 19 IU/L (ref 0–32)
AST SERPL-CCNC: 17 IU/L (ref 0–40)
BILIRUB SERPL-MCNC: 0.3 MG/DL (ref 0–1.2)
BUN SERPL-MCNC: 10 MG/DL (ref 6–24)
BUN/CREAT SERPL: 17 (ref 9–23)
CALCIUM SERPL-MCNC: 9.2 MG/DL (ref 8.7–10.2)
CHLORIDE SERPL-SCNC: 103 MMOL/L (ref 96–106)
CO2 SERPL-SCNC: 23 MMOL/L (ref 20–29)
CREAT SERPL-MCNC: 0.58 MG/DL (ref 0.57–1)
EGFRCR SERPLBLD CKD-EPI 2021: 109 ML/MIN/1.73
GLOBULIN SER CALC-MCNC: 2.8 G/DL (ref 1.5–4.5)
GLUCOSE SERPL-MCNC: 85 MG/DL (ref 70–99)
POTASSIUM SERPL-SCNC: 4.5 MMOL/L (ref 3.5–5.2)
PROT SERPL-MCNC: 7.1 G/DL (ref 6–8.5)
SODIUM SERPL-SCNC: 142 MMOL/L (ref 134–144)

## 2024-12-04 ENCOUNTER — OFFICE VISIT (OUTPATIENT)
Facility: CLINIC | Age: 54
End: 2024-12-04
Payer: COMMERCIAL

## 2024-12-04 VITALS
OXYGEN SATURATION: 97 % | WEIGHT: 223 LBS | HEIGHT: 61 IN | HEART RATE: 80 BPM | SYSTOLIC BLOOD PRESSURE: 131 MMHG | RESPIRATION RATE: 18 BRPM | TEMPERATURE: 98.2 F | BODY MASS INDEX: 42.1 KG/M2 | DIASTOLIC BLOOD PRESSURE: 80 MMHG

## 2024-12-04 DIAGNOSIS — J06.9 UPPER RESPIRATORY TRACT INFECTION, UNSPECIFIED TYPE: ICD-10-CM

## 2024-12-04 DIAGNOSIS — I10 ESSENTIAL HYPERTENSION: Primary | ICD-10-CM

## 2024-12-04 DIAGNOSIS — J30.9 ALLERGIC RHINITIS, UNSPECIFIED SEASONALITY, UNSPECIFIED TRIGGER: ICD-10-CM

## 2024-12-04 DIAGNOSIS — E66.01 MORBID OBESITY: ICD-10-CM

## 2024-12-04 PROCEDURE — 3079F DIAST BP 80-89 MM HG: CPT | Performed by: STUDENT IN AN ORGANIZED HEALTH CARE EDUCATION/TRAINING PROGRAM

## 2024-12-04 PROCEDURE — 99214 OFFICE O/P EST MOD 30 MIN: CPT | Performed by: STUDENT IN AN ORGANIZED HEALTH CARE EDUCATION/TRAINING PROGRAM

## 2024-12-04 PROCEDURE — 3075F SYST BP GE 130 - 139MM HG: CPT | Performed by: STUDENT IN AN ORGANIZED HEALTH CARE EDUCATION/TRAINING PROGRAM

## 2024-12-04 SDOH — ECONOMIC STABILITY: FOOD INSECURITY: WITHIN THE PAST 12 MONTHS, THE FOOD YOU BOUGHT JUST DIDN'T LAST AND YOU DIDN'T HAVE MONEY TO GET MORE.: NEVER TRUE

## 2024-12-04 SDOH — ECONOMIC STABILITY: FOOD INSECURITY: WITHIN THE PAST 12 MONTHS, YOU WORRIED THAT YOUR FOOD WOULD RUN OUT BEFORE YOU GOT MONEY TO BUY MORE.: NEVER TRUE

## 2024-12-04 SDOH — ECONOMIC STABILITY: INCOME INSECURITY: HOW HARD IS IT FOR YOU TO PAY FOR THE VERY BASICS LIKE FOOD, HOUSING, MEDICAL CARE, AND HEATING?: NOT VERY HARD

## 2024-12-04 ASSESSMENT — ENCOUNTER SYMPTOMS
ABDOMINAL PAIN: 0
SHORTNESS OF BREATH: 0
COUGH: 1

## 2024-12-04 NOTE — PROGRESS NOTES
\"Have you been to the ER, urgent care clinic since your last visit?  Hospitalized since your last visit?\"    NO    “Have you seen or consulted any other health care providers outside of [facility] since your last visit?”    NO    Chief Complaint   Patient presents with    Follow-up Chronic Condition     /80 (Site: Left Upper Arm, Position: Sitting, Cuff Size: Medium Adult)   Pulse 80   Temp 98.2 °F (36.8 °C) (Temporal)   Resp 18   Ht 1.549 m (5' 1\")   Wt 101.2 kg (223 lb)   LMP  (LMP Unknown)   SpO2 97%   BMI 42.14 kg/m²               Click Here for Release of Records Request   
  Smokeless Tobacco Never         Review of Systems   Constitutional:  Negative for fever.   Respiratory:  Positive for cough. Negative for shortness of breath.    Cardiovascular:  Negative for chest pain and palpitations.   Gastrointestinal:  Negative for abdominal pain.   Neurological:  Negative for dizziness, seizures and syncope.   Psychiatric/Behavioral:  Negative for agitation and behavioral problems.            Vitals:    12/04/24 1303   BP: 131/80   Site: Left Upper Arm   Position: Sitting   Cuff Size: Medium Adult   Pulse: 80   Resp: 18   Temp: 98.2 °F (36.8 °C)   TempSrc: Temporal   SpO2: 97%   Weight: 101.2 kg (223 lb)   Height: 1.549 m (5' 1\")      Physical Exam  Constitutional:       Appearance: Normal appearance.   Cardiovascular:      Rate and Rhythm: Normal rate and regular rhythm.   Pulmonary:      Effort: Pulmonary effort is normal.      Breath sounds: Normal breath sounds.   Neurological:      General: No focal deficit present.      Mental Status: She is alert. Mental status is at baseline.   Psychiatric:         Mood and Affect: Mood normal.         Behavior: Behavior normal.                 An electronic signature was used to authenticate this note.  -- Giovani Lance MD

## 2025-03-13 ENCOUNTER — TELEPHONE (OUTPATIENT)
Facility: CLINIC | Age: 55
End: 2025-03-13

## 2025-03-18 NOTE — TELEPHONE ENCOUNTER
She wants the accomodation to go to appointments. You did fill this out previously. When I asked the patient how many times during the week she went to appointments. She could not give me the specific dates because she states that she does not go all the time. She stated that her job does allow her up to 4 hours.  She states that Dr. Baker previously filled out these forms also. She stated that she also has high blood pressure, arthritis, and she stands on her feet for long periods of time.

## 2025-04-15 ENCOUNTER — OFFICE VISIT (OUTPATIENT)
Facility: CLINIC | Age: 55
End: 2025-04-15
Payer: COMMERCIAL

## 2025-04-15 VITALS
TEMPERATURE: 98 F | WEIGHT: 230 LBS | DIASTOLIC BLOOD PRESSURE: 82 MMHG | OXYGEN SATURATION: 97 % | RESPIRATION RATE: 18 BRPM | HEART RATE: 86 BPM | SYSTOLIC BLOOD PRESSURE: 116 MMHG | HEIGHT: 62 IN | BODY MASS INDEX: 42.33 KG/M2

## 2025-04-15 DIAGNOSIS — E66.813 CLASS 3 SEVERE OBESITY DUE TO EXCESS CALORIES WITH SERIOUS COMORBIDITY AND BODY MASS INDEX (BMI) OF 40.0 TO 44.9 IN ADULT: ICD-10-CM

## 2025-04-15 DIAGNOSIS — E78.00 ELEVATED LDL CHOLESTEROL LEVEL: ICD-10-CM

## 2025-04-15 DIAGNOSIS — I10 PRIMARY HYPERTENSION: Primary | ICD-10-CM

## 2025-04-15 DIAGNOSIS — I10 PRIMARY HYPERTENSION: ICD-10-CM

## 2025-04-15 PROCEDURE — 99214 OFFICE O/P EST MOD 30 MIN: CPT | Performed by: STUDENT IN AN ORGANIZED HEALTH CARE EDUCATION/TRAINING PROGRAM

## 2025-04-15 PROCEDURE — 3079F DIAST BP 80-89 MM HG: CPT | Performed by: STUDENT IN AN ORGANIZED HEALTH CARE EDUCATION/TRAINING PROGRAM

## 2025-04-15 PROCEDURE — 3074F SYST BP LT 130 MM HG: CPT | Performed by: STUDENT IN AN ORGANIZED HEALTH CARE EDUCATION/TRAINING PROGRAM

## 2025-04-15 SDOH — ECONOMIC STABILITY: FOOD INSECURITY: WITHIN THE PAST 12 MONTHS, YOU WORRIED THAT YOUR FOOD WOULD RUN OUT BEFORE YOU GOT MONEY TO BUY MORE.: NEVER TRUE

## 2025-04-15 SDOH — ECONOMIC STABILITY: FOOD INSECURITY: WITHIN THE PAST 12 MONTHS, THE FOOD YOU BOUGHT JUST DIDN'T LAST AND YOU DIDN'T HAVE MONEY TO GET MORE.: NEVER TRUE

## 2025-04-15 ASSESSMENT — PATIENT HEALTH QUESTIONNAIRE - PHQ9
SUM OF ALL RESPONSES TO PHQ QUESTIONS 1-9: 0
2. FEELING DOWN, DEPRESSED OR HOPELESS: NOT AT ALL
SUM OF ALL RESPONSES TO PHQ QUESTIONS 1-9: 0
1. LITTLE INTEREST OR PLEASURE IN DOING THINGS: NOT AT ALL
SUM OF ALL RESPONSES TO PHQ QUESTIONS 1-9: 0
SUM OF ALL RESPONSES TO PHQ QUESTIONS 1-9: 0

## 2025-04-15 ASSESSMENT — ENCOUNTER SYMPTOMS
SHORTNESS OF BREATH: 0
ABDOMINAL PAIN: 0
COUGH: 0

## 2025-04-15 NOTE — PROGRESS NOTES
\"Have you been to the ER, urgent care clinic since your last visit?  Hospitalized since your last visit?\"    NO    “Have you seen or consulted any other health care providers outside of [facility] since your last visit?”    NO    Chief Complaint   Patient presents with    Follow-up Chronic Condition     /82 (BP Site: Right Upper Arm, Patient Position: Sitting, BP Cuff Size: Medium Adult)   Pulse 86   Temp 98 °F (36.7 °C) (Temporal)   Resp 18   Ht 1.575 m (5' 2\")   Wt 104.3 kg (230 lb)   LMP  (LMP Unknown)   SpO2 97%   BMI 42.07 kg/m²               Click Here for Release of Records Request

## 2025-04-15 NOTE — PROGRESS NOTES
Leny Sawant (: 1970) is a 55 y.o. female, Established patient patient, here for evaluation of the following chief complaint(s):  Follow-up Chronic Condition       ASSESSMENT/PLAN:  Below is the assessment and plan developed based on review of pertinent history, physical exam, labs, studies, and medications.    1. Primary hypertension  -     CBC; Future  -     Comprehensive Metabolic Panel; Future  -     Lipid Panel; Future  2. Class 3 severe obesity due to excess calories with serious comorbidity and body mass index (BMI) of 40.0 to 44.9 in adult  3. Elevated LDL cholesterol level  -     Lipid Panel; Future      Hypertension controlled, continue current medications  She has gained weight, discussed lifestyle changes.  Avoid alcohol.  Drinks socially    She has an appointment with her gynecologist for Pap smear, mammogram.  Return in about 3 months (around 7/15/2025) for follow up.         SUBJECTIVE/OBJECTIVE:  HPI    Leny Sawant is a 55-year-old presents to clinic for a follow-up. She is on nifedipine for hypertension. She takes loratadine for allergies.     Previous mild elevation of ALT, which was normal during last blood work.  Occasional alcohol use.   No Known Allergies  Current Outpatient Medications   Medication Sig Dispense Refill    NIFEdipine (ADALAT CC) 60 MG extended release tablet Take 1 tablet by mouth daily 90 tablet 3    loratadine (CLARITIN) 10 MG tablet Take 1 tablet by mouth daily       No current facility-administered medications for this visit.      Past Medical History:   Diagnosis Date    Arthritis 3 years ago    Hypertension     Osteoarthritis      Past Surgical History:   Procedure Laterality Date    CHOLECYSTECTOMY      HYSTERECTOMY (CERVIX STATUS UNKNOWN)      HYSTERECTOMY, TOTAL ABDOMINAL (CERVIX REMOVED)       Family History   Problem Relation Age of Onset    Breast Cancer Paternal Aunt     Diabetes Father     Hypertension Mother     Arthritis Mother     High Blood

## 2025-04-24 ENCOUNTER — OFFICE VISIT (OUTPATIENT)
Age: 55
End: 2025-04-24
Payer: COMMERCIAL

## 2025-04-24 ENCOUNTER — HOSPITAL ENCOUNTER (OUTPATIENT)
Facility: HOSPITAL | Age: 55
Discharge: HOME | End: 2025-04-27
Attending: OBSTETRICS & GYNECOLOGY
Payer: COMMERCIAL

## 2025-04-24 ENCOUNTER — RESULTS FOLLOW-UP (OUTPATIENT)
Age: 55
End: 2025-04-24

## 2025-04-24 VITALS
WEIGHT: 226.13 LBS | DIASTOLIC BLOOD PRESSURE: 74 MMHG | HEIGHT: 61 IN | SYSTOLIC BLOOD PRESSURE: 116 MMHG | BODY MASS INDEX: 42.69 KG/M2

## 2025-04-24 DIAGNOSIS — Z90.710 HISTORY OF HYSTERECTOMY: ICD-10-CM

## 2025-04-24 DIAGNOSIS — I10 ESSENTIAL HYPERTENSION: ICD-10-CM

## 2025-04-24 DIAGNOSIS — N95.1 MENOPAUSAL SYNDROME: ICD-10-CM

## 2025-04-24 DIAGNOSIS — Z12.31 OTHER SCREENING MAMMOGRAM: ICD-10-CM

## 2025-04-24 DIAGNOSIS — Z12.72 SCREENING FOR VAGINAL CANCER: Primary | ICD-10-CM

## 2025-04-24 DIAGNOSIS — Z01.419 GYNECOLOGIC EXAM NORMAL: ICD-10-CM

## 2025-04-24 PROCEDURE — 3078F DIAST BP <80 MM HG: CPT | Performed by: OBSTETRICS & GYNECOLOGY

## 2025-04-24 PROCEDURE — 3074F SYST BP LT 130 MM HG: CPT | Performed by: OBSTETRICS & GYNECOLOGY

## 2025-04-24 PROCEDURE — 99396 PREV VISIT EST AGE 40-64: CPT | Performed by: OBSTETRICS & GYNECOLOGY

## 2025-04-24 PROCEDURE — 77063 BREAST TOMOSYNTHESIS BI: CPT

## 2025-04-24 ASSESSMENT — ENCOUNTER SYMPTOMS
RESPIRATORY NEGATIVE: 1
GASTROINTESTINAL NEGATIVE: 1

## 2025-04-24 NOTE — PROGRESS NOTES
Leny Sawant is a , 55 y.o. female   No LMP recorded (lmp unknown). Patient has had a hysterectomy.    She presents for her annual    She is having no significant problems.      Menstrual status:  Cycles are hysterectomy.    Flow: absent.      She does not have dysmenorrhea.      Medical conditions:  Since her last annual GYN exam about one year ago, she has not the following changes in her health history: none.     Mammogram History:    NICOLE Results (most recent):  @[facility]([id number]:1)@     DEXA Results (most recent):  @[facility]([id number]:1)@       Past Medical History:   Diagnosis Date    Arthritis 3 years ago    Hypertension     Osteoarthritis      Past Surgical History:   Procedure Laterality Date    CHOLECYSTECTOMY      HYSTERECTOMY (CERVIX STATUS UNKNOWN)      HYSTERECTOMY, TOTAL ABDOMINAL (CERVIX REMOVED)         Prior to Admission medications    Medication Sig Start Date End Date Taking? Authorizing Provider   NIFEdipine (ADALAT CC) 60 MG extended release tablet Take 1 tablet by mouth daily 24  Yes Giovani Lance MD   loratadine (CLARITIN) 10 MG tablet Take 1 tablet by mouth daily   Yes Automatic Reconciliation, Ar       No Known Allergies       Tobacco History:  reports that she has never smoked. She has never used smokeless tobacco.  Alcohol use:  reports current alcohol use of about 1.0 standard drink of alcohol per week.  Drug use:  reports no history of drug use.    Family Medical/Cancer History:   Family History   Problem Relation Age of Onset    Breast Cancer Paternal Aunt     Diabetes Father     Hypertension Mother     Arthritis Mother     High Blood Pressure Mother     High Cholesterol Mother         Review of Systems   Constitutional: Negative.    Respiratory: Negative.     Cardiovascular: Negative.    Gastrointestinal: Negative.    Genitourinary: Negative.    Musculoskeletal: Negative.    Neurological: Negative.    Psychiatric/Behavioral: Negative.           BP

## 2025-04-24 NOTE — PROGRESS NOTES
Chief Complaint   Patient presents with    Annual Exam     Mammo order sent on 3-31-25     /74 (BP Site: Right Upper Arm, Patient Position: Sitting, BP Cuff Size: Large Adult)   Ht 1.549 m (5' 1\")   Wt 102.6 kg (226 lb 2 oz)   LMP  (LMP Unknown)   BMI 42.73 kg/m²

## 2025-04-25 LAB
ALBUMIN SERPL-MCNC: 4.5 G/DL (ref 3.8–4.9)
ALP SERPL-CCNC: 103 IU/L (ref 44–121)
ALT SERPL-CCNC: 19 IU/L (ref 0–32)
AST SERPL-CCNC: 17 IU/L (ref 0–40)
BILIRUB SERPL-MCNC: 0.5 MG/DL (ref 0–1.2)
BUN SERPL-MCNC: 8 MG/DL (ref 6–24)
BUN/CREAT SERPL: 16 (ref 9–23)
CALCIUM SERPL-MCNC: 9.5 MG/DL (ref 8.7–10.2)
CHLORIDE SERPL-SCNC: 105 MMOL/L (ref 96–106)
CHOLEST SERPL-MCNC: 213 MG/DL (ref 100–199)
CO2 SERPL-SCNC: 22 MMOL/L (ref 20–29)
CREAT SERPL-MCNC: 0.51 MG/DL (ref 0.57–1)
EGFRCR SERPLBLD CKD-EPI 2021: 112 ML/MIN/1.73
ERYTHROCYTE [DISTWIDTH] IN BLOOD BY AUTOMATED COUNT: 11.1 % (ref 11.7–15.4)
GLOBULIN SER CALC-MCNC: 2.7 G/DL (ref 1.5–4.5)
GLUCOSE SERPL-MCNC: 75 MG/DL (ref 70–99)
HCT VFR BLD AUTO: 40.7 % (ref 34–46.6)
HDLC SERPL-MCNC: 66 MG/DL
HGB BLD-MCNC: 13.3 G/DL (ref 11.1–15.9)
LDLC SERPL CALC-MCNC: 134 MG/DL (ref 0–99)
MCH RBC QN AUTO: 31.2 PG (ref 26.6–33)
MCHC RBC AUTO-ENTMCNC: 32.7 G/DL (ref 31.5–35.7)
MCV RBC AUTO: 96 FL (ref 79–97)
PLATELET # BLD AUTO: 290 X10E3/UL (ref 150–450)
POTASSIUM SERPL-SCNC: 4.5 MMOL/L (ref 3.5–5.2)
PROT SERPL-MCNC: 7.2 G/DL (ref 6–8.5)
RBC # BLD AUTO: 4.26 X10E6/UL (ref 3.77–5.28)
SODIUM SERPL-SCNC: 141 MMOL/L (ref 134–144)
TRIGL SERPL-MCNC: 76 MG/DL (ref 0–149)
VLDLC SERPL CALC-MCNC: 13 MG/DL (ref 5–40)
WBC # BLD AUTO: 3.8 X10E3/UL (ref 3.4–10.8)

## 2025-05-04 LAB
., LABCORP: NORMAL
CYTOLOGIST CVX/VAG CYTO: NORMAL
CYTOLOGY CVX/VAG DOC CYTO: NORMAL
CYTOLOGY CVX/VAG DOC THIN PREP: NORMAL
DX ICD CODE: NORMAL
OTHER STN SPEC: NORMAL
SERVICE CMNT-IMP: NORMAL
STAT OF ADQ CVX/VAG CYTO-IMP: NORMAL

## 2025-05-05 ENCOUNTER — RESULTS FOLLOW-UP (OUTPATIENT)
Age: 55
End: 2025-05-05

## 2025-05-09 ENCOUNTER — RESULTS FOLLOW-UP (OUTPATIENT)
Facility: CLINIC | Age: 55
End: 2025-05-09

## 2025-05-09 DIAGNOSIS — E78.5 HYPERLIPIDEMIA, UNSPECIFIED HYPERLIPIDEMIA TYPE: Primary | ICD-10-CM

## 2025-05-09 RX ORDER — PRAVASTATIN SODIUM 20 MG
20 TABLET ORAL DAILY
Qty: 90 TABLET | Refills: 1 | Status: SHIPPED | OUTPATIENT
Start: 2025-05-09

## 2025-07-30 ENCOUNTER — TELEMEDICINE (OUTPATIENT)
Facility: CLINIC | Age: 55
End: 2025-07-30
Payer: COMMERCIAL

## 2025-07-30 DIAGNOSIS — I10 PRIMARY HYPERTENSION: Primary | ICD-10-CM

## 2025-07-30 DIAGNOSIS — E78.5 HYPERLIPIDEMIA, UNSPECIFIED HYPERLIPIDEMIA TYPE: ICD-10-CM

## 2025-07-30 DIAGNOSIS — Z12.11 SCREENING FOR COLON CANCER: ICD-10-CM

## 2025-07-30 PROCEDURE — 99214 OFFICE O/P EST MOD 30 MIN: CPT | Performed by: STUDENT IN AN ORGANIZED HEALTH CARE EDUCATION/TRAINING PROGRAM

## 2025-07-30 RX ORDER — PRAVASTATIN SODIUM 20 MG
20 TABLET ORAL DAILY
Qty: 90 TABLET | Refills: 3 | Status: SHIPPED | OUTPATIENT
Start: 2025-07-30

## 2025-07-30 NOTE — PROGRESS NOTES
\"Have you been to the ER, urgent care clinic since your last visit?  Hospitalized since your last visit?\"    NO    “Have you seen or consulted any other health care providers outside of [facility] since your last visit?”    NO    Chief Complaint   Patient presents with    Follow-up Chronic Condition                   Click Here for Release of Records Request

## 2025-07-30 NOTE — PROGRESS NOTES
Leny Sawant, was evaluated through a synchronous (real-time) audio-video encounter. The patient (or guardian if applicable) is aware that this is a billable service, which includes applicable co-pays. This Virtual Visit was conducted with patient's (and/or legal guardian's) consent. Patient identification was verified, and a caregiver was present when appropriate.   The patient was located at Home: [address] [facility] [address]  Provider was located at Facility (Appt Dept): [facility]   Confirm you are appropriately licensed, registered, or certified to deliver care in the state where the patient is located as indicated above. If you are not or unsure, please re-schedule the visit: Yes, I confirm.     Leny Sawant (:  1970) is a Established patient, presenting virtually for evaluation of the following:      Below is the assessment and plan developed based on review of pertinent history, physical exam, labs, studies, and medications.     Assessment & Plan  Primary hypertension   Does not have a blood pressure monitor, told her to monitor blood pressures, recommend buying monitor or check when she goes to the pharmacy.  Continue current regimen    Orders:    NIFEdipine (ADALAT CC) 60 MG extended release tablet; Take 1 tablet by mouth daily    Hyperlipidemia, unspecified hyperlipidemia type   Continue statin, recheck lipid panel    Orders:    Lipid Panel; Future    Comprehensive Metabolic Panel; Future    pravastatin (PRAVACHOL) 20 MG tablet; Take 1 tablet by mouth daily    Screening for colon cancer   Order placed for September    Orders:    Cologuard (Fecal DNA Colorectal Cancer Screening); Future  She is up-to-date with mammogram and Pap smear    Return in about 3 months (around 10/30/2025) for follow up.       Subjective   HPI  She is on nifedipine for hypertension. She takes loratadine for allergies.  Last visit she was started on pravastatin.  She is taking this medication.  She is